# Patient Record
Sex: FEMALE | Race: WHITE | NOT HISPANIC OR LATINO | Employment: UNEMPLOYED | URBAN - METROPOLITAN AREA
[De-identification: names, ages, dates, MRNs, and addresses within clinical notes are randomized per-mention and may not be internally consistent; named-entity substitution may affect disease eponyms.]

---

## 2020-11-19 ENCOUNTER — OFFICE VISIT (OUTPATIENT)
Dept: OBSTETRICS AND GYNECOLOGY | Facility: CLINIC | Age: 20
End: 2020-11-19

## 2020-11-19 VITALS
BODY MASS INDEX: 33.49 KG/M2 | DIASTOLIC BLOOD PRESSURE: 70 MMHG | TEMPERATURE: 97.3 F | WEIGHT: 189 LBS | SYSTOLIC BLOOD PRESSURE: 118 MMHG | HEIGHT: 63 IN

## 2020-11-19 DIAGNOSIS — Z00.00 ANNUAL PHYSICAL EXAM: Primary | ICD-10-CM

## 2020-11-19 PROCEDURE — 99385 PREV VISIT NEW AGE 18-39: CPT | Performed by: NURSE PRACTITIONER

## 2020-11-19 NOTE — PROGRESS NOTES
GYN Annual Exam     CC - Here for annual exam.        HPI  Nai Ramirez is a 20 y.o. female, No obstetric history on file., who presents for annual well woman exam. No LMP recorded..  Periods are regular every 25-35 days, lasting 5 days. .  Dysmenorrhea:mild, occurring first 1-2 days of flow.  Patient reports problems with: none.  There were no changes to her medical or surgical history since her last visit.. Partner Status: Marital Status: single.  New Partners since last visit: yes.  Desires STD Screening: yes. This will be her first gyn exam and pap smear. She denies any gyn problems today.     Additional OB/GYN History   Current contraception: contraceptive methods: None  Desires to: not use contraception at this time  Last Pap :none   Last Completed Pap Smear       Status Date      PAP SMEAR No completions recorded        History of abnormal Pap smear: no  Family history of uterine, colon, breast, or ovarian cancer: no  Performs monthly Self-Breast Exam: no  Exercises Regularly:no  Feelings of Anxiety or Depression: no  Tobacco Usage?: Yes Nai Ramirez  does not have a smoking history on file. She uses smokeless tobacco.. I have educated her on the risk of diseases from using tobacco products such as cancer, COPD and heart disease.     I advised her to quit and she is not willing to quit.    I spent 3  minutes counseling the patient.        OB History    No obstetric history on file.         Health Maintenance   Topic Date Due   • Annual Gynecologic Pelvic and Breast Exam  1999   • ANNUAL PHYSICAL  11/13/2002   • HPV VACCINES (1 - 2-dose series) 11/13/2010   • TDAP/TD VACCINES (1 - Tdap) 11/13/2018   • INFLUENZA VACCINE  08/01/2020   • HEPATITIS C SCREENING  11/17/2020   • CHLAMYDIA SCREENING  11/17/2020   • PAP SMEAR  11/17/2020   • Pneumococcal Vaccine 0-64  Aged Out   • MENINGOCOCCAL VACCINE (Normal Risk)  Aged Out       The additional following portions of the patient's history were  "reviewed and updated as appropriate: allergies, current medications, past family history, past medical history, past social history, past surgical history and problem list.    Review of Systems   Constitutional: Negative.    Gastrointestinal: Negative.    Genitourinary: Negative.    Psychiatric/Behavioral: Negative.    All other systems reviewed and are negative.    All other systems reviewed and are negative.     I have reviewed and agree with the HPI, ROS, and historical information as entered above. Yoselin Ayala, LANDON    Objective   /70   Temp 97.3 °F (36.3 °C)   Ht 160 cm (63\")   Wt 85.7 kg (189 lb)   BMI 33.48 kg/m²     Physical Exam  Vitals signs and nursing note reviewed. Exam conducted with a chaperone present.   Constitutional:       Appearance: Normal appearance.   Cardiovascular:      Rate and Rhythm: Normal rate and regular rhythm.   Chest:      Breasts:         Right: Normal.         Left: Normal.   Genitourinary:     General: Normal vulva.      Vagina: Normal.      Cervix: Normal.      Uterus: Normal.       Adnexa: Right adnexa normal and left adnexa normal.      Rectum: Normal.   Neurological:      Mental Status: She is alert.            Assessment and Plan    Problem List Items Addressed This Visit     None      Visit Diagnoses     Annual physical exam    -  Primary    Relevant Orders    Pap IG, Ct-Ng TV Rfx HPV ASCU          1. GYN annual well woman exam.   2. Encouraged use of condoms for STD prevention.  3. Reviewed monthly self breast exams.  Instructed to call with lumps, pain, or breast discharge.    4. Reviewed exercise as a preventative health measures.   5. Reccommended Flu Vaccine in Fall of each year.  6. RTC in 1 year or PRN with problems      LANDON Cotton  11/19/2020  "

## 2020-12-02 DIAGNOSIS — Z00.00 ANNUAL PHYSICAL EXAM: ICD-10-CM

## 2022-10-12 ENCOUNTER — INITIAL PRENATAL (OUTPATIENT)
Dept: OBSTETRICS AND GYNECOLOGY | Facility: CLINIC | Age: 22
End: 2022-10-12

## 2022-10-12 VITALS — WEIGHT: 156.6 LBS | DIASTOLIC BLOOD PRESSURE: 70 MMHG | BODY MASS INDEX: 27.74 KG/M2 | SYSTOLIC BLOOD PRESSURE: 120 MMHG

## 2022-10-12 DIAGNOSIS — Z3A.08 8 WEEKS GESTATION OF PREGNANCY: Primary | ICD-10-CM

## 2022-10-12 DIAGNOSIS — O99.330 TOBACCO USE DURING PREGNANCY, ANTEPARTUM: ICD-10-CM

## 2022-10-12 PROBLEM — Z34.90 PREGNANCY: Status: ACTIVE | Noted: 2022-10-12

## 2022-10-12 PROCEDURE — 0501F PRENATAL FLOW SHEET: CPT | Performed by: OBSTETRICS & GYNECOLOGY

## 2022-10-12 NOTE — PROGRESS NOTES
Initial ob visit     CC- Here for care of pregnancy        Nai Ramirez is a 21 y.o. female, , who presents for her first obstetrical visit.  Her last LMP was Patient's last menstrual period was 2022.. Her VLAD is 2023, by Last Menstrual Period. Current GA is 8w1d.     Initial positive test date : , UPT        Her periods are: every 4 weeks  Patient's past medical history is significant for: marijuana and vapes nicotine.  Family history of genetic issues (includes FOB): denies   Prior infections concerning in pregnancy (Rash, fever in last 2 weeks): No  Varicella Hx - vaccinated   Prior testing for Cystic Fibrosis Carrier or Sickle Cell Trait- no   History of STD: no  Hx of HSV for patient or partner: no  Ultrasound Today: yes; confirms viable iup    OB History    Para Term  AB Living   1             SAB IAB Ectopic Molar Multiple Live Births                    # Outcome Date GA Lbr Vlad/2nd Weight Sex Delivery Anes PTL Lv   1 Current                Additional Pertinent History   Last Pap :  Result: negative HPV: not done     Last Completed Pap Smear          Ordered - PAP SMEAR (Every 3 Years) Ordered on 10/12/2022    2020  Pap IG, Ct-Ng TV Rfx HPV ASCU              History of abnormal Pap smear: no  Family history of uterine, colon, breast, or ovarian cancer: no  Feelings of Anxiety or Depression: no  Tobacco Usage?: Yes Nai Ramirez She vapes nicotine. I have educated her on the risk of diseases from using tobacco products such as cancer, COPD and heart disease.     I advised her to quit and she is willing to quit. We have discussed the following method/s for tobacco cessation:  Education Material Counseling.            Alcohol/Drug Use?: YES Drug: regular daily use; pt reports she is smoking marijuana in the morning and it helps manage her nausea  Over the age of 35 at delivery: no  Desires Genetic Screening: undecided   Flu Status:  Declines    PMH    Current Outpatient Medications:   •  Prenatal Vit-Fe Fumarate-FA (PRENATAL VITAMIN PO), Take  by mouth., Disp: , Rfl:      Past Medical History:   Diagnosis Date   • Urinary tract infection         History reviewed. No pertinent surgical history.    Review of Systems   Review of Systems  Patient Reports: Nausea and vomiting.  She has also had pink tinged discharge with wiping   Patient Denies: Heavy bleeding and Cramping  All systems reviewed and otherwise normal.    I have reviewed and agree with the HPI, ROS, and historical information as entered above. Maggi Collins MD    /70   Wt 71 kg (156 lb 9.6 oz)   LMP 08/16/2022   BMI 27.74 kg/m²     The additional following portions of the patient's history were reviewed and updated as appropriate: allergies, current medications, past family history, past medical history, past social history, past surgical history and problem list.    Physical Exam  General:  well developed; well nourished  no acute distress   Chest/Respiratory: No labored breathing, normal respiratory effort, normal appearance, no respiratory noises noted   Heart:  normal rate, regular rhythm,  no murmurs, rubs, or gallops   Thyroid: not examined   Breasts:  Not performed.   Abdomen: soft, non-tender; no masses  no umbilical or inguinal hernias are present  no hepato-splenomegaly   Pelvis: Clinical staff was present for exam  External genitalia:  normal appearance of the external genitalia including Bartholin's and [place]'s glands.  :  urethral meatus normal;  Vaginal:  normal pink mucosa without prolapse or lesions.  Cervix:  normal appearance.        Assessment and Plan    Problem List Items Addressed This Visit     Pregnancy - Primary    Overview     Reports family history of blood clot, is going to find out if tested for thrombophilia         Relevant Orders    LIQUID-BASED PAP SMEAR, [facility] ([facility])    Obstetric Panel    HIV-1 / O / 2 Ag / Antibody 4th Generation     Urinalysis With Microscopic - Urine, Clean Catch    Urine Culture - Urine, Urine, Clean Catch    Urine Drug Screen - Urine, Clean Catch    Tobacco use during pregnancy, antepartum       1. Pregnancy at 8w1d  2. Encouraged smoking cessation, she is working on it  3. Reviewed routine prenatal care with the office and educational materials given  4. Lab(s) Ordered  5. Discussed options for genetic testing including first trimester nuchal translucency screen, genetic disease carrier testing, quadruple screen, and Wing.  6. Patient is on Prenatal vitamins  7. Activity recommendation : 150 minutes/week of moderate intensity aerobic activity unless we limit for bleeding, hypertension or other pregnancy complication   Return in about 1 month (around 11/12/2022) for F/U Prenatal.      Maggi Collins MD  10/12/2022

## 2022-10-13 LAB
ABO GROUP BLD: ABNORMAL
BASOPHILS # BLD AUTO: 0.1 X10E3/UL (ref 0–0.2)
BASOPHILS NFR BLD AUTO: 0 %
BLD GP AB SCN SERPL QL: NEGATIVE
EOSINOPHIL # BLD AUTO: 0.1 X10E3/UL (ref 0–0.4)
EOSINOPHIL NFR BLD AUTO: 1 %
ERYTHROCYTE [DISTWIDTH] IN BLOOD BY AUTOMATED COUNT: 11.8 % (ref 11.7–15.4)
HBV SURFACE AG SERPL QL IA: NEGATIVE
HCT VFR BLD AUTO: 41.1 % (ref 34–46.6)
HCV AB S/CO SERPL IA: <0.1 S/CO RATIO (ref 0–0.9)
HGB BLD-MCNC: 14 G/DL (ref 11.1–15.9)
HIV 1+2 AB+HIV1 P24 AG SERPL QL IA: NON REACTIVE
IMM GRANULOCYTES # BLD AUTO: 0.1 X10E3/UL (ref 0–0.1)
IMM GRANULOCYTES NFR BLD AUTO: 1 %
LYMPHOCYTES # BLD AUTO: 2.4 X10E3/UL (ref 0.7–3.1)
LYMPHOCYTES NFR BLD AUTO: 16 %
MCH RBC QN AUTO: 33 PG (ref 26.6–33)
MCHC RBC AUTO-ENTMCNC: 34.1 G/DL (ref 31.5–35.7)
MCV RBC AUTO: 97 FL (ref 79–97)
MONOCYTES # BLD AUTO: 0.8 X10E3/UL (ref 0.1–0.9)
MONOCYTES NFR BLD AUTO: 5 %
NEUTROPHILS # BLD AUTO: 11.8 X10E3/UL (ref 1.4–7)
NEUTROPHILS NFR BLD AUTO: 77 %
PLATELET # BLD AUTO: 272 X10E3/UL (ref 150–450)
RBC # BLD AUTO: 4.24 X10E6/UL (ref 3.77–5.28)
RH BLD: POSITIVE
RPR SER QL: NON REACTIVE
RUBV IGG SERPL IA-ACNC: 1.8 INDEX
WBC # BLD AUTO: 15.1 X10E3/UL (ref 3.4–10.8)

## 2022-10-14 LAB
AMPHETAMINES UR QL SCN: NEGATIVE NG/ML
APPEARANCE UR: ABNORMAL
BACTERIA #/AREA URNS HPF: ABNORMAL /[HPF]
BACTERIA UR CULT: NORMAL
BACTERIA UR CULT: NORMAL
BARBITURATES UR QL SCN: NEGATIVE NG/ML
BENZODIAZ UR QL SCN: NEGATIVE NG/ML
BILIRUB UR QL STRIP: NEGATIVE
BZE UR QL SCN: NEGATIVE NG/ML
CANNABINOIDS UR QL SCN: POSITIVE NG/ML
CASTS URNS QL MICRO: ABNORMAL /LPF
COLOR UR: YELLOW
CREAT UR-MCNC: 48.9 MG/DL (ref 20–300)
EPI CELLS #/AREA URNS HPF: >10 /HPF (ref 0–10)
GLUCOSE UR QL STRIP: NEGATIVE
HGB UR QL STRIP: NEGATIVE
KETONES UR QL STRIP: NEGATIVE
LABORATORY COMMENT REPORT: ABNORMAL
LEUKOCYTE ESTERASE UR QL STRIP: ABNORMAL
METHADONE UR QL SCN: NEGATIVE NG/ML
MICRO URNS: ABNORMAL
NITRITE UR QL STRIP: NEGATIVE
OPIATES UR QL SCN: NEGATIVE NG/ML
OXYCODONE+OXYMORPHONE UR QL SCN: NEGATIVE NG/ML
PCP UR QL: NEGATIVE NG/ML
PH UR STRIP: 8 [PH] (ref 5–7.5)
PH UR: 7.7 [PH] (ref 4.5–8.9)
PROPOXYPH UR QL SCN: NEGATIVE NG/ML
PROT UR QL STRIP: NEGATIVE
RBC #/AREA URNS HPF: ABNORMAL /HPF (ref 0–2)
REF LAB TEST METHOD: NORMAL
SP GR UR STRIP: 1.01 (ref 1–1.03)
UROBILINOGEN UR STRIP-MCNC: 0.2 MG/DL (ref 0.2–1)
WBC #/AREA URNS HPF: ABNORMAL /HPF (ref 0–5)

## 2022-10-25 ENCOUNTER — LAB (OUTPATIENT)
Dept: OBSTETRICS AND GYNECOLOGY | Facility: CLINIC | Age: 22
End: 2022-10-25

## 2022-10-25 DIAGNOSIS — Z34.91 FIRST TRIMESTER PREGNANCY: Primary | ICD-10-CM

## 2022-11-09 ENCOUNTER — ROUTINE PRENATAL (OUTPATIENT)
Dept: OBSTETRICS AND GYNECOLOGY | Facility: CLINIC | Age: 22
End: 2022-11-09

## 2022-11-09 VITALS — DIASTOLIC BLOOD PRESSURE: 70 MMHG | BODY MASS INDEX: 28.52 KG/M2 | SYSTOLIC BLOOD PRESSURE: 120 MMHG | WEIGHT: 161 LBS

## 2022-11-09 DIAGNOSIS — R82.5 POSITIVE URINE DRUG SCREEN: Primary | ICD-10-CM

## 2022-11-09 DIAGNOSIS — O99.330 TOBACCO USE DURING PREGNANCY, ANTEPARTUM: ICD-10-CM

## 2022-11-09 DIAGNOSIS — Z3A.12 12 WEEKS GESTATION OF PREGNANCY: ICD-10-CM

## 2022-11-09 LAB
GLUCOSE UR STRIP-MCNC: NEGATIVE MG/DL
PROT UR STRIP-MCNC: NEGATIVE MG/DL

## 2022-11-09 PROCEDURE — 0502F SUBSEQUENT PRENATAL CARE: CPT | Performed by: OBSTETRICS & GYNECOLOGY

## 2022-11-09 NOTE — PROGRESS NOTES
OB FOLLOW UP  CC- Here for care of pregnancy        Nai Ramirez is a 21 y.o.  12w1d patient being seen today for her obstetrical follow up visit. Patient reports nausea and occasional vomiting but has drastically improved since her last visit. cfdna neg/girl baby.  She has decreased marijuana usage and vaping nicotine.     Her prenatal care is complicated by (and status) :   Patient Active Problem List   Diagnosis   • Pregnancy   • Tobacco use during pregnancy, antepartum         Flu Status: Declines  Ultrasound Today: No    ROS -   Patient Denies: Loss of Fluid, Vaginal Spotting, Vision Changes and Headaches    All other systems reviewed and are negative.     The additional following portions of the patient's history were reviewed and updated as appropriate: allergies and current medications.    I have reviewed and agree with the HPI, ROS, and historical information as entered above. Maggi Collins MD    /70   Wt 73 kg (161 lb)   LMP 2022   BMI 28.52 kg/m²         EXAM:     Prenatal Vitals  BP: 120/70  Weight: 73 kg (161 lb)   Fetal Heart Rate: +          Urine Glucose Read-only: Negative  Urine Protein Read-only: Negative       Assessment and Plan    Problem List Items Addressed This Visit     Pregnancy    Overview     Reports family history of blood clot, is going to find out if tested for thrombophilia  cfDNA low risk         Tobacco use during pregnancy, antepartum   Other Visit Diagnoses     Positive urine drug screen    -  Primary    Relevant Orders    Urine Drug Screen - Urine, Clean Catch          1. Pregnancy at 12w1d  2. She reports there was no family history of thrombophilia detected (her mom has history of DVT)  3. Labs reviewed from New OB Visit.  4. Counseled on genetic testing, carrier status and option for NT screen  5. Activity and Exercise discussed.  6. Patient is on Prenatal vitamins  Return in about 1 month (around 2022) for F/U Prenatal.    Maggi Collins  MD  11/09/2022

## 2022-11-09 NOTE — PROGRESS NOTES
"        OB FOLLOW UP  CC- Here for care of pregnancy        Nai Ramirez is a 21 y.o.  12w1d patient being seen today for her obstetrical follow up visit. Patient reports {pregnancy complaints lexob:08386}.     Her prenatal care is complicated by (and status) : {OB problems:84349}  Patient Active Problem List   Diagnosis   • Pregnancy   • Tobacco use during pregnancy, antepartum       Desires genetic testing?: {Genetic Testing?:04460}  Flu Status: {Current Flu Status:97556}  Ultrasound Today: {Responses; yes/no (default no):15152}    ROS -   Patient Reports : {OB Symptoms:30916}  Patient Denies: {OBSymptoms:98454}  Fetal Movement : {desc; normal/decreased:88855}  All other systems reviewed and are negative.     The additional following portions of the patient's history were reviewed and updated as appropriate: {history reviewed:::\"allergies\",\"current medications\",\"past family history\",\"past medical history\",\"past social history\",\"past surgical history\",\"problem list\"}.    I have reviewed and agree with the HPI, ROS, and historical information as entered above. Anabella Moreno MA    LMP 2022         EXAM:                    Urine Glucose Read-only: Negative  Urine Protein Read-only: Negative       Assessment and Plan    Problem List Items Addressed This Visit        Gravid and     Pregnancy    Overview     Reports family history of blood clot, is going to find out if tested for thrombophilia        Other Visit Diagnoses     Mild tetrahydrocannabinol (THC) abuse    -  Primary    Relevant Orders    Urine Drug Screen - Urine, Clean Catch          1. Pregnancy at 12w1d  2. Labs reviewed from New OB Visit.  3. Counseled on genetic testing, carrier status and option for NT screen  4. Activity and Exercise discussed.  5. {pregnancy plan 12wks lexob:45045::\"Patient is on Prenatal vitamins\"}  6. No follow-ups on file.    Anabella Moreno MA  2022  "

## 2022-11-11 LAB
AMPHETAMINES UR QL SCN: NEGATIVE NG/ML
BARBITURATES UR QL SCN: NEGATIVE NG/ML
BENZODIAZ UR QL SCN: NEGATIVE NG/ML
BZE UR QL SCN: NEGATIVE NG/ML
CANNABINOIDS UR QL SCN: POSITIVE NG/ML
CREAT UR-MCNC: 78.9 MG/DL (ref 20–300)
LABORATORY COMMENT REPORT: ABNORMAL
METHADONE UR QL SCN: NEGATIVE NG/ML
OPIATES UR QL SCN: NEGATIVE NG/ML
OXYCODONE+OXYMORPHONE UR QL SCN: NEGATIVE NG/ML
PCP UR QL: NEGATIVE NG/ML
PH UR: 7.3 [PH] (ref 4.5–8.9)
PROPOXYPH UR QL SCN: NEGATIVE NG/ML

## 2022-11-16 ENCOUNTER — TELEPHONE (OUTPATIENT)
Dept: OBSTETRICS AND GYNECOLOGY | Facility: CLINIC | Age: 22
End: 2022-11-16

## 2022-11-16 NOTE — TELEPHONE ENCOUNTER
Hub staff attempted to follow warm transfer process and was unsuccessful     Caller: Nai Ramirez    Relationship to patient: Self    Best call back number: [phone]    Patient is needing: PT TRIED RETURNING CALL SHE MISSED. I WAS UNABLE TO WARM TRANSFER TO THE OFFICE.   PT CAN BE REACHED ANYTIME DURING THE DAY AND A VM CAN BE LEFT IF SHE ISN'T REACHED.

## 2022-11-17 ENCOUNTER — TELEPHONE (OUTPATIENT)
Dept: OBSTETRICS AND GYNECOLOGY | Facility: CLINIC | Age: 22
End: 2022-11-17

## 2022-11-17 NOTE — TELEPHONE ENCOUNTER
Patient of Dr Collins  13 weeks 2 days    Patient reports that she has been experiencing vomiting (nothing new)  but this morning she vomited for the 3rd time (mostly mucus mixed with a small amount of bright red blood). She is concerned about the small amount of bright red blood. She states that she also had a migraine and she is experiencing congestion and drainage. She reports she did drink red klever-aid this morning around 0100 and the instance of vomiting with blood mixed with mucus was at 0600.      Encouraged patient to monitor further and if she begins to vomit large amounts of blood to report to the ER for evaluation. Pt NILSON.        -Janusz Wakefield

## 2022-12-07 ENCOUNTER — ROUTINE PRENATAL (OUTPATIENT)
Dept: OBSTETRICS AND GYNECOLOGY | Facility: CLINIC | Age: 22
End: 2022-12-07

## 2022-12-07 VITALS — BODY MASS INDEX: 29.41 KG/M2 | WEIGHT: 166 LBS | SYSTOLIC BLOOD PRESSURE: 118 MMHG | DIASTOLIC BLOOD PRESSURE: 66 MMHG

## 2022-12-07 DIAGNOSIS — O99.330 TOBACCO USE DURING PREGNANCY, ANTEPARTUM: ICD-10-CM

## 2022-12-07 DIAGNOSIS — Z3A.16 16 WEEKS GESTATION OF PREGNANCY: Primary | ICD-10-CM

## 2022-12-07 DIAGNOSIS — Z3A.20 20 WEEKS GESTATION OF PREGNANCY: ICD-10-CM

## 2022-12-07 LAB
GLUCOSE UR STRIP-MCNC: NEGATIVE MG/DL
PROT UR STRIP-MCNC: NEGATIVE MG/DL

## 2022-12-07 PROCEDURE — 0502F SUBSEQUENT PRENATAL CARE: CPT | Performed by: NURSE PRACTITIONER

## 2022-12-07 NOTE — PROGRESS NOTES
OB FOLLOW UP  CC- Here for care of pregnancy        Nai Ramirez is a 22 y.o.  16w1d patient being seen today for her obstetrical follow up visit. Patient reports hemorrhoids which over the counter meds are controlling.  She has a hx of lactose intolerance.  To help with constipation, she eats dairy.    Her prenatal care is complicated by (and status) :   Patient Active Problem List   Diagnosis   • Pregnancy   • Tobacco use during pregnancy, antepartum       Flu Status: Declines  Ultrasound Today: No    AFP: discussed and decided to be drawn today.  ROS -   Patient Reports : No Problems  Patient Denies: Loss of Fluid, Vaginal Spotting, Vision Changes, Headaches, Nausea  and Vomiting   Fetal Movement : normal  All other systems reviewed and are negative.     The additional following portions of the patient's history were reviewed and updated as appropriate: allergies, current medications, past family history, past medical history, past social history, past surgical history and problem list.    I have reviewed and agree with the HPI, ROS, and historical information as entered above. Andreia Kim, APRN        EXAM:     Prenatal Vitals  BP: 118/66  Weight: 75.3 kg (166 lb)   Fetal Heart Rate: 145   Pelvic Exam:      Urine Glucose Read-only: Negative  Urine Protein Read-only: Negative       Assessment and Plan    Problem List Items Addressed This Visit        Gravid and     Pregnancy - Primary    Overview     cfDNA low risk         Relevant Orders    Alpha Fetoprotein, Maternal    US Ob 14 + Weeks Single or First Gestation       Tobacco    Tobacco use during pregnancy, antepartum       1. Pregnancy at 16w1d, FHT 145bpm.  2. Fetal status reassuring.   3. Counseled on MSAFP alone in relation to OTD and placental issues.  Drawn today.  4. Anatomy scan next visit.   5. Activity and Exercise discussed.  6. Lab(s) Ordered  7. U/S ordered at follow up  8. Patient is on Prenatal  vitamins  9. Discussed/encouraged smoking cessation  Return in about 4 weeks (around 1/4/2023) for anatomy US.    LANDON Beverly  12/07/2022 Scribed for LANDON Beverly by Teresa NAVARRETE. 12/7/2022  12:33 EST

## 2022-12-09 LAB
AFP INTERP SERPL-IMP: NORMAL
AFP INTERP SERPL-IMP: NORMAL
AFP MOM SERPL: 0.73
AFP SERPL-MCNC: 24.4 NG/ML
AGE AT DELIVERY: 23.5 YR
GA METHOD: NORMAL
GA: 16.1 WEEKS
IDDM PATIENT QL: NO
LABORATORY COMMENT REPORT: NORMAL
MULTIPLE PREGNANCY: NO
NEURAL TUBE DEFECT RISK FETUS: NORMAL %
RESULT: NORMAL

## 2023-01-04 ENCOUNTER — ROUTINE PRENATAL (OUTPATIENT)
Dept: OBSTETRICS AND GYNECOLOGY | Facility: CLINIC | Age: 23
End: 2023-01-04
Payer: COMMERCIAL

## 2023-01-04 VITALS — WEIGHT: 174 LBS | DIASTOLIC BLOOD PRESSURE: 70 MMHG | SYSTOLIC BLOOD PRESSURE: 110 MMHG | BODY MASS INDEX: 30.82 KG/M2

## 2023-01-04 DIAGNOSIS — O99.330 TOBACCO USE DURING PREGNANCY, ANTEPARTUM: ICD-10-CM

## 2023-01-04 DIAGNOSIS — Z3A.20 20 WEEKS GESTATION OF PREGNANCY: ICD-10-CM

## 2023-01-04 DIAGNOSIS — Z34.00 SUPERVISION OF NORMAL FIRST PREGNANCY, ANTEPARTUM: Primary | ICD-10-CM

## 2023-01-04 LAB
GLUCOSE UR STRIP-MCNC: NEGATIVE MG/DL
PROT UR STRIP-MCNC: NEGATIVE MG/DL

## 2023-01-04 PROCEDURE — 0502F SUBSEQUENT PRENATAL CARE: CPT | Performed by: OBSTETRICS & GYNECOLOGY

## 2023-01-04 NOTE — PROGRESS NOTES
OB FOLLOW UP  CC- Here for care of pregnancy        Nai Ramirez is a 22 y.o.  20w1d patient being seen today for her obstetrical follow up visit. Patient reports AFP neg. C/o (R) sided sciatic pain. C/o occasional headaches, thin clear watery discharge, and sinus congestion.      Her prenatal care is complicated by (and status) :   Patient Active Problem List   Diagnosis   • Pregnancy   • Tobacco use during pregnancy, antepartum       Flu Status: Declines  Ultrasound Today: Yes; LVOT and cranial anatomy suboptimally visualized, need profile and RVOT @ follow up and fluid noted in cervix    ROS -     Patient Denies: Loss of Fluid, Vaginal Spotting, Vision Changes, Nausea , Vomiting  and Contractions  Fetal Movement : normal  All other systems reviewed and are negative.       The additional following portions of the patient's history were reviewed and updated as appropriate: allergies, current medications, past family history, past medical history, past social history, past surgical history and problem list.    I have reviewed and agree with the HPI, ROS, and historical information as entered above. Maggi Collins MD    /70   Wt 78.9 kg (174 lb)   LMP 2022   BMI 30.82 kg/m²       EXAM:     Prenatal Vitals  BP: 110/70  Weight: 78.9 kg (174 lb)   Fetal Heart Rate: 138          Urine Glucose Read-only: Negative  Urine Protein Read-only: Negative       Assessment and Plan    Problem List Items Addressed This Visit     Pregnancy    Overview     cfDNA low risk, AFP neg.          Tobacco use during pregnancy, antepartum    Relevant Orders    US Ob Transvaginal    US Ob Follow Up Transabdominal Approach   Other Visit Diagnoses     Supervision of normal first pregnancy, antepartum    -  Primary    Relevant Orders    POC Urinalysis Dipstick (Completed)    US Ob Transvaginal    US Ob Follow Up Transabdominal Approach          1. Pregnancy at 20w1d  2. Anatomy scan today is incomplete, needs FU views.  also some fluid seen in cervical canal, will repeat CL in 1 week.   3. Fetal status reassuring.   4. Activity and Exercise discussed.  5. Patient is on Prenatal vitamins  Return in about 1 week (around 1/11/2023) for F/U Prenatal, U/S Next Visit.    Maggi Collins MD  01/04/2023

## 2023-01-11 ENCOUNTER — ROUTINE PRENATAL (OUTPATIENT)
Dept: OBSTETRICS AND GYNECOLOGY | Facility: CLINIC | Age: 23
End: 2023-01-11
Payer: COMMERCIAL

## 2023-01-11 ENCOUNTER — OFFICE VISIT (OUTPATIENT)
Dept: OBSTETRICS AND GYNECOLOGY | Facility: HOSPITAL | Age: 23
End: 2023-01-11
Payer: COMMERCIAL

## 2023-01-11 ENCOUNTER — HOSPITAL ENCOUNTER (OUTPATIENT)
Dept: WOMENS IMAGING | Facility: HOSPITAL | Age: 23
Discharge: HOME | End: 2023-01-11
Admitting: OBSTETRICS & GYNECOLOGY
Payer: COMMERCIAL

## 2023-01-11 VITALS — BODY MASS INDEX: 31.11 KG/M2 | WEIGHT: 175.6 LBS | DIASTOLIC BLOOD PRESSURE: 70 MMHG | SYSTOLIC BLOOD PRESSURE: 122 MMHG

## 2023-01-11 VITALS — BODY MASS INDEX: 30.93 KG/M2 | DIASTOLIC BLOOD PRESSURE: 65 MMHG | WEIGHT: 174.6 LBS | SYSTOLIC BLOOD PRESSURE: 109 MMHG

## 2023-01-11 DIAGNOSIS — Z3A.21 21 WEEKS GESTATION OF PREGNANCY: ICD-10-CM

## 2023-01-11 DIAGNOSIS — Z34.00 SUPERVISION OF NORMAL FIRST PREGNANCY, ANTEPARTUM: Primary | ICD-10-CM

## 2023-01-11 DIAGNOSIS — Z34.00 SUPERVISION OF NORMAL FIRST PREGNANCY, ANTEPARTUM: ICD-10-CM

## 2023-01-11 DIAGNOSIS — N88.8 CERVICAL FUNNELING: Primary | ICD-10-CM

## 2023-01-11 DIAGNOSIS — N88.8 CERVICAL FUNNELING: ICD-10-CM

## 2023-01-11 DIAGNOSIS — O99.330 TOBACCO USE DURING PREGNANCY, ANTEPARTUM: ICD-10-CM

## 2023-01-11 LAB
GLUCOSE UR STRIP-MCNC: NEGATIVE MG/DL
PROT UR STRIP-MCNC: NEGATIVE MG/DL

## 2023-01-11 PROCEDURE — 76811 OB US DETAILED SNGL FETUS: CPT

## 2023-01-11 PROCEDURE — 76817 TRANSVAGINAL US OBSTETRIC: CPT

## 2023-01-11 PROCEDURE — 0502F SUBSEQUENT PRENATAL CARE: CPT | Performed by: OBSTETRICS & GYNECOLOGY

## 2023-01-11 PROCEDURE — 99214 OFFICE O/P EST MOD 30 MIN: CPT | Performed by: OBSTETRICS & GYNECOLOGY

## 2023-01-11 RX ORDER — PROGESTERONE 100 MG/1
100 CAPSULE ORAL DAILY
Qty: 30 CAPSULE | Refills: 6 | Status: SHIPPED | OUTPATIENT
Start: 2023-01-11 | End: 2023-02-01 | Stop reason: SDUPTHER

## 2023-01-11 NOTE — PROGRESS NOTES
Denies problems. Reports had NIPS with normal results. Next OB visit is 2/8/2023. Cervical funneling noted by u/s @ prenatal visit today.

## 2023-01-11 NOTE — PROGRESS NOTES
Patient seen in Maternal Fetal Medicine clinic today. Please see full note in under imaging tab of patient chart in Epic (Viewpoint report).    Tracy Mcdonough MD

## 2023-01-11 NOTE — PROGRESS NOTES
OB FOLLOW UP  CC- Here for care of pregnancy        Nai Ramirez is a 22 y.o.  21w1d patient being seen today for her obstetrical follow up visit. Patient reports occasional vomiting (*1 this morning) headaches.  She vapes nicotine and is decreasing use.     Her prenatal care is complicated by (and status) :   Patient Active Problem List   Diagnosis   • Pregnancy   • Tobacco use during pregnancy, antepartum   • Cervical funneling       Flu Status: Declines  Ultrasound Today: Yes, breech presentation. Anechoic fluid visualized throughout cervix extending to external os. V shaped funneling noted.     ROS -     Patient Denies: Loss of Fluid, Vaginal Spotting, Vision Changes, Nausea , Contractions and Epigastric pain  Fetal Movement : normal  All other systems reviewed and are negative.       The additional following portions of the patient's history were reviewed and updated as appropriate: allergies, current medications, past family history, past medical history, past social history, past surgical history and problem list.    I have reviewed and agree with the HPI, ROS, and historical information as entered above. Maggi Collins MD    /70   Wt 79.7 kg (175 lb 9.6 oz)   LMP 2022   BMI 31.11 kg/m²       EXAM:     Prenatal Vitals  BP: 122/70  Weight: 79.7 kg (175 lb 9.6 oz)   Fetal Heart Rate: 129          Urine Glucose Read-only: Negative  Urine Protein Read-only: Negative       Assessment and Plan    Problem List Items Addressed This Visit        Gynecologic and Obstetric Problems    Cervical funneling    Overview     21 wks. CL 2.9cm, but small funnel seen 20 and 21 wks. Vaginal progesterone 100mg, refer PDC            Other    Pregnancy    Overview     cfDNA low risk, AFP neg.          Tobacco use during pregnancy, antepartum    Relevant Orders    [facility]   Other Visit Diagnoses     Supervision of normal first pregnancy, antepartum    -  Primary    Relevant  Orders    POC Urinalysis Dipstick (Completed)    [facility]          1. Pregnancy at 21w1d  2. Anatomy scan today is refer to PDC for small cervical funnel by end of week. Start prometrium 100mg PV daily. Cont pelvic rest and no heavy lifting.   3. Fetal status reassuring.   4. Activity and Exercise discussed.  5. Patient is on Prenatal vitamins  Return in about 1 month (around 2023) for F/U Prenatal.    Maggi Collins MD  2023

## 2023-01-18 ENCOUNTER — HOSPITAL ENCOUNTER (OUTPATIENT)
Dept: WOMENS IMAGING | Facility: HOSPITAL | Age: 23
Discharge: HOME | End: 2023-01-18
Admitting: OBSTETRICS & GYNECOLOGY
Payer: COMMERCIAL

## 2023-01-18 ENCOUNTER — HOSPITAL ENCOUNTER (INPATIENT)
Facility: HOSPITAL | Age: 23
LOS: 2 days | Discharge: HOME | DRG: 819 | End: 2023-01-20
Attending: OBSTETRICS & GYNECOLOGY | Admitting: OBSTETRICS & GYNECOLOGY
Payer: COMMERCIAL

## 2023-01-18 ENCOUNTER — OFFICE VISIT (OUTPATIENT)
Dept: OBSTETRICS AND GYNECOLOGY | Facility: HOSPITAL | Age: 23
End: 2023-01-18
Payer: COMMERCIAL

## 2023-01-18 VITALS — BODY MASS INDEX: 30.79 KG/M2 | DIASTOLIC BLOOD PRESSURE: 70 MMHG | WEIGHT: 173.8 LBS | SYSTOLIC BLOOD PRESSURE: 114 MMHG

## 2023-01-18 DIAGNOSIS — N88.8 CERVICAL FUNNELING: ICD-10-CM

## 2023-01-18 DIAGNOSIS — Z3A.22 22 WEEKS GESTATION OF PREGNANCY: ICD-10-CM

## 2023-01-18 DIAGNOSIS — O26.872 CERVICAL SHORTENING AFFECTING PREGNANCY IN SECOND TRIMESTER: Primary | ICD-10-CM

## 2023-01-18 LAB
ABO GROUP BLD: NORMAL
ABO GROUP BLD: NORMAL
BLD GP AB SCN SERPL QL: NEGATIVE
DEPRECATED RDW RBC AUTO: 42.2 FL (ref 37–54)
ERYTHROCYTE [DISTWIDTH] IN BLOOD BY AUTOMATED COUNT: 12.1 % (ref 12.3–15.4)
HCT VFR BLD AUTO: 35.6 % (ref 34–46.6)
HGB BLD-MCNC: 12.5 G/DL (ref 12–15.9)
MCH RBC QN AUTO: 33.5 PG (ref 26.6–33)
MCHC RBC AUTO-ENTMCNC: 35.1 G/DL (ref 31.5–35.7)
MCV RBC AUTO: 95.4 FL (ref 79–97)
PLATELET # BLD AUTO: 257 10*3/MM3 (ref 140–450)
PMV BLD AUTO: 10.6 FL (ref 6–12)
RBC # BLD AUTO: 3.73 10*6/MM3 (ref 3.77–5.28)
RH BLD: POSITIVE
RH BLD: POSITIVE
T&S EXPIRATION DATE: NORMAL
WBC NRBC COR # BLD: 13.95 10*3/MM3 (ref 3.4–10.8)

## 2023-01-18 PROCEDURE — 86901 BLOOD TYPING SEROLOGIC RH(D): CPT | Performed by: OBSTETRICS & GYNECOLOGY

## 2023-01-18 PROCEDURE — 99222 1ST HOSP IP/OBS MODERATE 55: CPT | Performed by: OBSTETRICS & GYNECOLOGY

## 2023-01-18 PROCEDURE — 76815 OB US LIMITED FETUS(S): CPT | Performed by: OBSTETRICS & GYNECOLOGY

## 2023-01-18 PROCEDURE — 76815 OB US LIMITED FETUS(S): CPT

## 2023-01-18 PROCEDURE — 85027 COMPLETE CBC AUTOMATED: CPT | Performed by: OBSTETRICS & GYNECOLOGY

## 2023-01-18 PROCEDURE — 25010000002 AMPICILLIN PER 500 MG: Performed by: OBSTETRICS & GYNECOLOGY

## 2023-01-18 PROCEDURE — 86900 BLOOD TYPING SEROLOGIC ABO: CPT | Performed by: OBSTETRICS & GYNECOLOGY

## 2023-01-18 PROCEDURE — 86900 BLOOD TYPING SEROLOGIC ABO: CPT

## 2023-01-18 PROCEDURE — 76817 TRANSVAGINAL US OBSTETRIC: CPT

## 2023-01-18 PROCEDURE — 86901 BLOOD TYPING SEROLOGIC RH(D): CPT

## 2023-01-18 PROCEDURE — 25010000002 AZITHROMYCIN PER 500 MG: Performed by: OBSTETRICS & GYNECOLOGY

## 2023-01-18 PROCEDURE — 76817 TRANSVAGINAL US OBSTETRIC: CPT | Performed by: OBSTETRICS & GYNECOLOGY

## 2023-01-18 PROCEDURE — 86850 RBC ANTIBODY SCREEN: CPT | Performed by: OBSTETRICS & GYNECOLOGY

## 2023-01-18 RX ORDER — SODIUM CHLORIDE 0.9 % (FLUSH) 0.9 %
10 SYRINGE (ML) INJECTION EVERY 12 HOURS SCHEDULED
Status: DISCONTINUED | OUTPATIENT
Start: 2023-01-18 | End: 2023-01-20 | Stop reason: HOSPADM

## 2023-01-18 RX ORDER — SODIUM CHLORIDE, SODIUM LACTATE, POTASSIUM CHLORIDE, CALCIUM CHLORIDE 600; 310; 30; 20 MG/100ML; MG/100ML; MG/100ML; MG/100ML
125 INJECTION, SOLUTION INTRAVENOUS CONTINUOUS
Status: DISCONTINUED | OUTPATIENT
Start: 2023-01-18 | End: 2023-01-20 | Stop reason: HOSPADM

## 2023-01-18 RX ORDER — LIDOCAINE HYDROCHLORIDE 10 MG/ML
5 INJECTION, SOLUTION EPIDURAL; INFILTRATION; INTRACAUDAL; PERINEURAL AS NEEDED
Status: DISCONTINUED | OUTPATIENT
Start: 2023-01-18 | End: 2023-01-20 | Stop reason: HOSPADM

## 2023-01-18 RX ORDER — ONDANSETRON 2 MG/ML
4 INJECTION INTRAMUSCULAR; INTRAVENOUS EVERY 8 HOURS PRN
Status: DISCONTINUED | OUTPATIENT
Start: 2023-01-18 | End: 2023-01-20 | Stop reason: HOSPADM

## 2023-01-18 RX ORDER — ACETAMINOPHEN 500 MG
500 TABLET ORAL EVERY 6 HOURS PRN
COMMUNITY

## 2023-01-18 RX ORDER — INDOMETHACIN 25 MG/1
50 CAPSULE ORAL ONCE
Status: COMPLETED | OUTPATIENT
Start: 2023-01-18 | End: 2023-01-18

## 2023-01-18 RX ORDER — ONDANSETRON 4 MG/1
4 TABLET, FILM COATED ORAL EVERY 8 HOURS PRN
Status: DISCONTINUED | OUTPATIENT
Start: 2023-01-18 | End: 2023-01-20 | Stop reason: HOSPADM

## 2023-01-18 RX ORDER — SODIUM CHLORIDE 0.9 % (FLUSH) 0.9 %
1-10 SYRINGE (ML) INJECTION AS NEEDED
Status: DISCONTINUED | OUTPATIENT
Start: 2023-01-18 | End: 2023-01-20 | Stop reason: HOSPADM

## 2023-01-18 RX ORDER — HYDROXYZINE HYDROCHLORIDE 25 MG/1
25 TABLET, FILM COATED ORAL NIGHTLY PRN
Status: DISCONTINUED | OUTPATIENT
Start: 2023-01-18 | End: 2023-01-20 | Stop reason: HOSPADM

## 2023-01-18 RX ORDER — INDOMETHACIN 25 MG/1
25 CAPSULE ORAL EVERY 6 HOURS
Status: DISCONTINUED | OUTPATIENT
Start: 2023-01-18 | End: 2023-01-20 | Stop reason: HOSPADM

## 2023-01-18 RX ADMIN — INDOMETHACIN 50 MG: 25 CAPSULE ORAL at 13:47

## 2023-01-18 RX ADMIN — HYDROXYZINE HYDROCHLORIDE 25 MG: 25 TABLET, FILM COATED ORAL at 21:22

## 2023-01-18 RX ADMIN — AZITHROMYCIN 500 MG: 500 INJECTION, POWDER, LYOPHILIZED, FOR SOLUTION INTRAVENOUS at 14:32

## 2023-01-18 RX ADMIN — SODIUM CHLORIDE, POTASSIUM CHLORIDE, SODIUM LACTATE AND CALCIUM CHLORIDE 125 ML/HR: 600; 310; 30; 20 INJECTION, SOLUTION INTRAVENOUS at 13:30

## 2023-01-18 RX ADMIN — SODIUM CHLORIDE, POTASSIUM CHLORIDE, SODIUM LACTATE AND CALCIUM CHLORIDE 125 ML/HR: 600; 310; 30; 20 INJECTION, SOLUTION INTRAVENOUS at 19:37

## 2023-01-18 RX ADMIN — AMPICILLIN SODIUM 2 G: 2 INJECTION, POWDER, FOR SOLUTION INTRAVENOUS at 19:37

## 2023-01-18 RX ADMIN — AMPICILLIN SODIUM 2 G: 2 INJECTION, POWDER, FOR SOLUTION INTRAVENOUS at 13:50

## 2023-01-18 RX ADMIN — Medication 10 ML: at 21:22

## 2023-01-18 RX ADMIN — INDOMETHACIN 25 MG: 25 CAPSULE ORAL at 19:32

## 2023-01-18 NOTE — PROGRESS NOTES
Patient seen in Maternal Fetal Medicine clinic today. Please see full note in under imaging tab of patient chart in Epic (Viewpoint report).    Maggi Pascal MD

## 2023-01-19 ENCOUNTER — ANESTHESIA EVENT (OUTPATIENT)
Dept: LABOR AND DELIVERY | Facility: HOSPITAL | Age: 23
DRG: 819 | End: 2023-01-19
Payer: COMMERCIAL

## 2023-01-19 PROCEDURE — 25010000002 ONDANSETRON PER 1 MG: Performed by: NURSE ANESTHETIST, CERTIFIED REGISTERED

## 2023-01-19 PROCEDURE — 25010000002 FENTANYL CITRATE (PF) 100 MCG/2ML SOLUTION: Performed by: NURSE ANESTHETIST, CERTIFIED REGISTERED

## 2023-01-19 PROCEDURE — 25010000002 ONDANSETRON PER 1 MG: Performed by: OBSTETRICS & GYNECOLOGY

## 2023-01-19 PROCEDURE — 25010000002 AMPICILLIN PER 500 MG: Performed by: OBSTETRICS & GYNECOLOGY

## 2023-01-19 PROCEDURE — 59320 REVISION OF CERVIX: CPT | Performed by: OBSTETRICS & GYNECOLOGY

## 2023-01-19 PROCEDURE — 25010000002 MIDAZOLAM PER 1 MG: Performed by: NURSE ANESTHETIST, CERTIFIED REGISTERED

## 2023-01-19 PROCEDURE — 0UVC7ZZ RESTRICTION OF CERVIX, VIA NATURAL OR ARTIFICIAL OPENING: ICD-10-PCS | Performed by: OBSTETRICS & GYNECOLOGY

## 2023-01-19 PROCEDURE — 25010000002 AZITHROMYCIN PER 500 MG: Performed by: OBSTETRICS & GYNECOLOGY

## 2023-01-19 RX ORDER — ONDANSETRON 2 MG/ML
INJECTION INTRAMUSCULAR; INTRAVENOUS AS NEEDED
Status: DISCONTINUED | OUTPATIENT
Start: 2023-01-19 | End: 2023-01-19 | Stop reason: SURG

## 2023-01-19 RX ORDER — BUPIVACAINE HYDROCHLORIDE 7.5 MG/ML
INJECTION, SOLUTION INTRASPINAL AS NEEDED
Status: DISCONTINUED | OUTPATIENT
Start: 2023-01-19 | End: 2023-01-19 | Stop reason: SURG

## 2023-01-19 RX ORDER — OXYCODONE HYDROCHLORIDE 5 MG/1
5 TABLET ORAL ONCE
Status: COMPLETED | OUTPATIENT
Start: 2023-01-19 | End: 2023-01-19

## 2023-01-19 RX ORDER — MIDAZOLAM HYDROCHLORIDE 1 MG/ML
INJECTION INTRAMUSCULAR; INTRAVENOUS AS NEEDED
Status: DISCONTINUED | OUTPATIENT
Start: 2023-01-19 | End: 2023-01-19 | Stop reason: SURG

## 2023-01-19 RX ORDER — FENTANYL CITRATE 50 UG/ML
INJECTION, SOLUTION INTRAMUSCULAR; INTRAVENOUS AS NEEDED
Status: DISCONTINUED | OUTPATIENT
Start: 2023-01-19 | End: 2023-01-19 | Stop reason: SURG

## 2023-01-19 RX ORDER — ACETAMINOPHEN 500 MG
500 TABLET ORAL EVERY 6 HOURS PRN
Status: DISCONTINUED | OUTPATIENT
Start: 2023-01-19 | End: 2023-01-20 | Stop reason: HOSPADM

## 2023-01-19 RX ORDER — FAMOTIDINE 10 MG/ML
INJECTION, SOLUTION INTRAVENOUS AS NEEDED
Status: DISCONTINUED | OUTPATIENT
Start: 2023-01-19 | End: 2023-01-19 | Stop reason: SURG

## 2023-01-19 RX ORDER — TRISODIUM CITRATE DIHYDRATE AND CITRIC ACID MONOHYDRATE 500; 334 MG/5ML; MG/5ML
30 SOLUTION ORAL ONCE
Status: COMPLETED | OUTPATIENT
Start: 2023-01-19 | End: 2023-01-19

## 2023-01-19 RX ADMIN — FENTANYL CITRATE 20 MCG: 50 INJECTION, SOLUTION INTRAMUSCULAR; INTRAVENOUS at 07:49

## 2023-01-19 RX ADMIN — MIDAZOLAM 1 MG: 1 INJECTION INTRAMUSCULAR; INTRAVENOUS at 07:44

## 2023-01-19 RX ADMIN — HYDROXYZINE HYDROCHLORIDE 25 MG: 25 TABLET, FILM COATED ORAL at 21:43

## 2023-01-19 RX ADMIN — ACETAMINOPHEN 500 MG: 500 TABLET, FILM COATED ORAL at 15:30

## 2023-01-19 RX ADMIN — SODIUM CHLORIDE, POTASSIUM CHLORIDE, SODIUM LACTATE AND CALCIUM CHLORIDE 1000 ML/HR: 600; 310; 30; 20 INJECTION, SOLUTION INTRAVENOUS at 07:01

## 2023-01-19 RX ADMIN — AMPICILLIN SODIUM 2 G: 2 INJECTION, POWDER, FOR SOLUTION INTRAVENOUS at 14:28

## 2023-01-19 RX ADMIN — OXYCODONE HYDROCHLORIDE 5 MG: 5 TABLET ORAL at 17:21

## 2023-01-19 RX ADMIN — SODIUM CHLORIDE, POTASSIUM CHLORIDE, SODIUM LACTATE AND CALCIUM CHLORIDE 1000 ML/HR: 600; 310; 30; 20 INJECTION, SOLUTION INTRAVENOUS at 07:39

## 2023-01-19 RX ADMIN — INDOMETHACIN 25 MG: 25 CAPSULE ORAL at 16:35

## 2023-01-19 RX ADMIN — INDOMETHACIN 25 MG: 25 CAPSULE ORAL at 23:39

## 2023-01-19 RX ADMIN — AMPICILLIN SODIUM 2 G: 2 INJECTION, POWDER, FOR SOLUTION INTRAVENOUS at 19:38

## 2023-01-19 RX ADMIN — SODIUM CHLORIDE, POTASSIUM CHLORIDE, SODIUM LACTATE AND CALCIUM CHLORIDE 125 ML/HR: 600; 310; 30; 20 INJECTION, SOLUTION INTRAVENOUS at 16:39

## 2023-01-19 RX ADMIN — ONDANSETRON 4 MG: 2 INJECTION INTRAMUSCULAR; INTRAVENOUS at 16:35

## 2023-01-19 RX ADMIN — OXYCODONE HYDROCHLORIDE 5 MG: 5 TABLET ORAL at 10:33

## 2023-01-19 RX ADMIN — BUPIVACAINE HYDROCHLORIDE IN DEXTROSE 1.4 ML: 7.5 INJECTION, SOLUTION SUBARACHNOID at 07:49

## 2023-01-19 RX ADMIN — INDOMETHACIN 25 MG: 25 CAPSULE ORAL at 01:41

## 2023-01-19 RX ADMIN — ONDANSETRON 4 MG: 2 INJECTION INTRAMUSCULAR; INTRAVENOUS at 07:44

## 2023-01-19 RX ADMIN — SODIUM CHLORIDE, POTASSIUM CHLORIDE, SODIUM LACTATE AND CALCIUM CHLORIDE 125 ML/HR: 600; 310; 30; 20 INJECTION, SOLUTION INTRAVENOUS at 09:05

## 2023-01-19 RX ADMIN — AZITHROMYCIN 500 MG: 500 INJECTION, POWDER, LYOPHILIZED, FOR SOLUTION INTRAVENOUS at 15:28

## 2023-01-19 RX ADMIN — AMPICILLIN SODIUM 2 G: 2 INJECTION, POWDER, FOR SOLUTION INTRAVENOUS at 07:27

## 2023-01-19 RX ADMIN — FAMOTIDINE 20 MG: 10 INJECTION, SOLUTION INTRAVENOUS at 07:44

## 2023-01-19 RX ADMIN — SODIUM CITRATE AND CITRIC ACID MONOHYDRATE 30 ML: 500; 334 SOLUTION ORAL at 07:38

## 2023-01-19 RX ADMIN — AMPICILLIN SODIUM 2 G: 2 INJECTION, POWDER, FOR SOLUTION INTRAVENOUS at 01:41

## 2023-01-19 RX ADMIN — INDOMETHACIN 25 MG: 25 CAPSULE ORAL at 10:33

## 2023-01-19 NOTE — ANESTHESIA PROCEDURE NOTES
Spinal Block      Patient reassessed immediately prior to procedure    Patient location during procedure: OR  Indication:at surgeon's request  Performed By  CRNA/ARACELY: Sisi Browning CRNA  Preanesthetic Checklist  Completed: patient identified, IV checked, site marked, risks and benefits discussed, surgical consent, monitors and equipment checked, pre-op evaluation and timeout performed  Spinal Block Prep:  Patient Position:sitting  Sterile Tech:cap, gloves, mask and sterile barriers  Prep:Betadine  Patient Monitoring:blood pressure monitoring, continuous pulse oximetry and EKG    Spinal Block Procedure  Approach:midline  Guidance:palpation technique  Location:L3-L4  Needle Type:Buck  Needle Gauge:25 G  Placement of Spinal needle event:cerebrospinal fluid aspirated  Paresthesia: no  Fluid Appearance:clear     Post Assessment  Patient Tolerance:patient tolerated the procedure well with no apparent complications  Complications no

## 2023-01-19 NOTE — ANESTHESIA PREPROCEDURE EVALUATION
Anesthesia Evaluation     Patient summary reviewed and Nursing notes reviewed   NPO Solid Status: > 8 hours  NPO Liquid Status: > 2 hours           Airway   Mallampati: II  TM distance: >3 FB  Neck ROM: full  Dental      Pulmonary - negative pulmonary ROS   Cardiovascular - negative cardio ROS        Neuro/Psych- negative ROS  GI/Hepatic/Renal/Endo - negative ROS     Musculoskeletal (-) negative ROS    Abdominal    Substance History - negative use     OB/GYN    (+) Pregnant,         Other - negative ROS                       Anesthesia Plan    ASA 2     ITN and spinal       Anesthetic plan, risks, benefits, and alternatives have been provided, discussed and informed consent has been obtained with: patient.        CODE STATUS:    Code Status (Patient has no pulse and is not breathing): CPR (Attempt to Resuscitate)  Medical Interventions (Patient has pulse or is breathing): Full Support

## 2023-01-19 NOTE — ANESTHESIA POSTPROCEDURE EVALUATION
Patient: Nai Ramirez    Procedure Summary     Date: 01/19/23 [facility]  [facility]    Anesthesia Start: 0743 Anesthesia Stop: 0829    Procedure: CERVICAL CERCLAGE (Cervix) Diagnosis:     Surgeons: Maggi Pascal MD Provider: Laya Lala DO    Anesthesia Type: ITN, spinal ASA Status: 2          Anesthesia Type: ITN, spinal    Vitals  Vitals Value Taken Time   /60 01/19/23 0825   Temp 98 °F (36.7 °C) 01/19/23 0825   Pulse 60 01/19/23 0828   Resp 16 01/19/23 0825   SpO2 100 % 01/19/23 0828   Vitals shown include unvalidated device data.        Post Anesthesia Care and Evaluation    Patient location during evaluation: bedside  Patient participation: complete - patient participated  Level of consciousness: awake and alert  Pain management: adequate    Airway patency: patent  Anesthetic complications: No anesthetic complications    Cardiovascular status: acceptable  Respiratory status: acceptable  Hydration status: acceptable

## 2023-01-20 VITALS
DIASTOLIC BLOOD PRESSURE: 62 MMHG | HEIGHT: 63 IN | OXYGEN SATURATION: 100 % | BODY MASS INDEX: 30.65 KG/M2 | WEIGHT: 173 LBS | RESPIRATION RATE: 18 BRPM | HEART RATE: 90 BPM | TEMPERATURE: 98.2 F | SYSTOLIC BLOOD PRESSURE: 111 MMHG

## 2023-01-20 PROCEDURE — 25010000002 AMPICILLIN PER 500 MG: Performed by: OBSTETRICS & GYNECOLOGY

## 2023-01-20 PROCEDURE — 99238 HOSP IP/OBS DSCHRG MGMT 30/<: CPT | Performed by: OBSTETRICS & GYNECOLOGY

## 2023-01-20 RX ORDER — POLYETHYLENE GLYCOL 3350 17 G/17G
17 POWDER, FOR SOLUTION ORAL DAILY
Status: DISCONTINUED | OUTPATIENT
Start: 2023-01-20 | End: 2023-01-20 | Stop reason: HOSPADM

## 2023-01-20 RX ORDER — DOCUSATE SODIUM 100 MG/1
100 CAPSULE, LIQUID FILLED ORAL 2 TIMES DAILY
Status: DISCONTINUED | OUTPATIENT
Start: 2023-01-20 | End: 2023-01-20 | Stop reason: HOSPADM

## 2023-01-20 RX ADMIN — INDOMETHACIN 25 MG: 25 CAPSULE ORAL at 05:11

## 2023-01-20 RX ADMIN — AMPICILLIN SODIUM 2 G: 2 INJECTION, POWDER, FOR SOLUTION INTRAVENOUS at 08:16

## 2023-01-20 RX ADMIN — AMPICILLIN SODIUM 2 G: 2 INJECTION, POWDER, FOR SOLUTION INTRAVENOUS at 02:13

## 2023-01-20 RX ADMIN — DOCUSATE SODIUM 100 MG: 100 CAPSULE, LIQUID FILLED ORAL at 08:25

## 2023-01-20 RX ADMIN — INDOMETHACIN 25 MG: 25 CAPSULE ORAL at 11:03

## 2023-01-20 RX ADMIN — POLYETHYLENE GLYCOL 3350 17 G: 17 POWDER, FOR SOLUTION ORAL at 08:25

## 2023-01-20 NOTE — ANESTHESIA POSTPROCEDURE EVALUATION
Patient: Nai Ramirez    Procedure Summary     Date: 01/19/23 Room / Location: [facility] LABOR DELIVERY 1 /  [facility]    Anesthesia Start: 0743 Anesthesia Stop: 0829    Procedure: CERVICAL CERCLAGE (Cervix) Diagnosis:     Surgeons: Maggi Pascal MD Provider: Laya Lala DO    Anesthesia Type: ITN, spinal ASA Status: 2          Anesthesia Type: ITN, spinal    Vitals  Vitals Value Taken Time   /62 01/20/23 0802   Temp 98.4 °F (36.9 °C) 01/20/23 0327   Pulse 90 01/20/23 0802   Resp 16 01/20/23 0327   SpO2 98 % 01/19/23 0947   Vitals shown include unvalidated device data.        Post Anesthesia Care and Evaluation    Patient location during evaluation: bedside  Patient participation: complete - patient participated  Level of consciousness: awake and alert  Pain management: adequate    Airway patency: patent  Anesthetic complications: No anesthetic complications    Cardiovascular status: acceptable  Respiratory status: acceptable  Hydration status: acceptable  Post Neuraxial Block status: Motor and sensory function returned to baseline and No signs or symptoms of PDPH

## 2023-01-23 DIAGNOSIS — N88.8 CERVICAL FUNNELING: ICD-10-CM

## 2023-01-23 DIAGNOSIS — O34.30 CERVICAL CERCLAGE SUTURE PRESENT, ANTEPARTUM: ICD-10-CM

## 2023-01-23 DIAGNOSIS — O26.872 CERVICAL SHORTENING AFFECTING PREGNANCY IN SECOND TRIMESTER: Primary | ICD-10-CM

## 2023-01-28 ENCOUNTER — DOCUMENTATION (OUTPATIENT)
Dept: OBSTETRICS AND GYNECOLOGY | Facility: CLINIC | Age: 23
End: 2023-01-28
Payer: COMMERCIAL

## 2023-01-28 DIAGNOSIS — O26.892 VAGINAL DISCHARGE DURING PREGNANCY IN SECOND TRIMESTER: ICD-10-CM

## 2023-01-28 DIAGNOSIS — N89.8 VAGINAL DISCHARGE DURING PREGNANCY IN SECOND TRIMESTER: ICD-10-CM

## 2023-01-28 DIAGNOSIS — N89.8 VAGINAL ITCHING: Primary | ICD-10-CM

## 2023-01-28 NOTE — PROGRESS NOTES
, 23w4d. S/p cervical cerclage 2023. Patient called w/ c/o vaginal discharge (thick, yellow), vulvar/vaginal  itching, foul smelling d/c and a red spotty rash on the right side of her vagina that she first noticed this morning. She said she has been inserting progesterone capsules vaginally QHS for approximately 3 weeks. Her discharge with progesterone capsules has been watery but has changed. She denies DFM, LOF, cramping, contractions & vaginal bleeding. Patient has an appointment scheduled with PDC Monday morning. Patient advised to wait until her appt Monday for evaluation and treatment if symptoms are not severe. Patient advised to call for LOF, vag bleeding, DFM, or signs of labor. Pt NILSON.

## 2023-01-30 ENCOUNTER — HOSPITAL ENCOUNTER (OUTPATIENT)
Dept: WOMENS IMAGING | Facility: HOSPITAL | Age: 23
Discharge: HOME | End: 2023-01-30
Admitting: OBSTETRICS & GYNECOLOGY
Payer: COMMERCIAL

## 2023-01-30 ENCOUNTER — OFFICE VISIT (OUTPATIENT)
Dept: OBSTETRICS AND GYNECOLOGY | Facility: HOSPITAL | Age: 23
End: 2023-01-30
Payer: COMMERCIAL

## 2023-01-30 VITALS — BODY MASS INDEX: 31.04 KG/M2 | DIASTOLIC BLOOD PRESSURE: 74 MMHG | WEIGHT: 175.2 LBS | SYSTOLIC BLOOD PRESSURE: 113 MMHG

## 2023-01-30 DIAGNOSIS — O34.30 CERVICAL CERCLAGE SUTURE PRESENT, ANTEPARTUM: ICD-10-CM

## 2023-01-30 DIAGNOSIS — Z3A.23 23 WEEKS GESTATION OF PREGNANCY: ICD-10-CM

## 2023-01-30 DIAGNOSIS — N88.8 CERVICAL FUNNELING: Primary | ICD-10-CM

## 2023-01-30 DIAGNOSIS — O26.872 CERVICAL SHORTENING AFFECTING PREGNANCY IN SECOND TRIMESTER: ICD-10-CM

## 2023-01-30 DIAGNOSIS — N88.8 CERVICAL FUNNELING: ICD-10-CM

## 2023-01-30 PROCEDURE — 76815 OB US LIMITED FETUS(S): CPT | Performed by: OBSTETRICS & GYNECOLOGY

## 2023-01-30 PROCEDURE — 76817 TRANSVAGINAL US OBSTETRIC: CPT | Performed by: OBSTETRICS & GYNECOLOGY

## 2023-01-30 PROCEDURE — 76817 TRANSVAGINAL US OBSTETRIC: CPT

## 2023-01-30 PROCEDURE — 76815 OB US LIMITED FETUS(S): CPT

## 2023-01-30 RX ORDER — FLUCONAZOLE 150 MG/1
150 TABLET ORAL ONCE
Qty: 1 TABLET | Refills: 1 | Status: SHIPPED | OUTPATIENT
Start: 2023-01-30 | End: 2023-01-30

## 2023-02-01 ENCOUNTER — ROUTINE PRENATAL (OUTPATIENT)
Dept: OBSTETRICS AND GYNECOLOGY | Facility: CLINIC | Age: 23
End: 2023-02-01
Payer: COMMERCIAL

## 2023-02-01 VITALS — SYSTOLIC BLOOD PRESSURE: 114 MMHG | BODY MASS INDEX: 31.35 KG/M2 | WEIGHT: 177 LBS | DIASTOLIC BLOOD PRESSURE: 74 MMHG

## 2023-02-01 DIAGNOSIS — O99.330 TOBACCO USE DURING PREGNANCY, ANTEPARTUM: ICD-10-CM

## 2023-02-01 DIAGNOSIS — Z3A.24 24 WEEKS GESTATION OF PREGNANCY: Primary | ICD-10-CM

## 2023-02-01 DIAGNOSIS — N88.8 CERVICAL FUNNELING: ICD-10-CM

## 2023-02-01 DIAGNOSIS — O26.872 CERVICAL SHORTENING AFFECTING PREGNANCY IN SECOND TRIMESTER: ICD-10-CM

## 2023-02-01 LAB
EXPIRATION DATE: 0
GLUCOSE UR STRIP-MCNC: NEGATIVE MG/DL
Lab: 0
PROT UR STRIP-MCNC: NEGATIVE MG/DL

## 2023-02-01 PROCEDURE — 0502F SUBSEQUENT PRENATAL CARE: CPT | Performed by: OBSTETRICS & GYNECOLOGY

## 2023-02-01 RX ORDER — PROGESTERONE 100 MG/1
100 CAPSULE ORAL DAILY
Qty: 30 CAPSULE | Refills: 6 | Status: SHIPPED | OUTPATIENT
Start: 2023-02-01 | End: 2023-02-14 | Stop reason: SDUPTHER

## 2023-02-01 NOTE — PROGRESS NOTES
OB FOLLOW UP  CC- Here for care of pregnancy        Nai Ramirez is a 22 y.o.  24w1d patient being seen today for her obstetrical follow up visit. Patient reports nausea after drinking something sugary.    Her prenatal care is complicated by (and status) : None  Patient Active Problem List   Diagnosis   • Pregnancy   • Tobacco use during pregnancy, antepartum   • Cervical funneling   • Cervical shortening affecting pregnancy in second trimester       Flu Status: Declines  Ultrasound Today: No    ROS -   Patient Reports : nausea after drinking a sugary drink  Patient Denies: Loss of Fluid, Vaginal Spotting and Vision Changes  Fetal Movement : normal  All other systems reviewed and are negative.       The additional following portions of the patient's history were reviewed and updated as appropriate: allergies, current medications, past family history, past medical history, past social history, past surgical history and problem list.    I have reviewed and agree with the HPI, ROS, and historical information as entered above. Maggi Collins MD    /74   Wt 80.3 kg (177 lb)   LMP 2022   BMI 31.35 kg/m²       EXAM:     Prenatal Vitals  BP: 114/74  Weight: 80.3 kg (177 lb)   Fetal Heart Rate: +      Fundal Height (cm): 24 cm        Urine Glucose Read-only: Negative  Urine Protein Read-only: Negative       Assessment and Plan    Problem List Items Addressed This Visit        Gynecologic and Obstetric Problems    Cervical funneling    Overview     21 wks. CL 2.9cm, but small funnel seen 20 and 21 wks. Vaginal progesterone 100mg, refer PDC         Cervical shortening affecting pregnancy in second trimester    Overview     S/p cerclage 22 wks            Other    Pregnancy - Primary    Overview     cfDNA low risk, AFP neg.          Relevant Orders    POC Protein, Urine, Qualitative, Dipstick (Completed)    POC Glucose, Urine, Qualitative, Dipstick (Completed)    Tobacco use during pregnancy,  antepartum       1. Pregnancy at 24w1d. Had cerclage 2 weeks ago, and then FU with dr caban last week, and CL was 2.4-2.6 above the stitch. She is not having any contractions or bleeding and feels well. Needs refill of progesterone.   2. Fetal status reassuring.  3. No US indicated today.  4. 1 hour gtt, CBC, Antibody screen and TDAP next visit. Instructions given  5. Discussed/encouraged TDAP vaccination after 28 weeks  6. Activity and Exercise discussed.  Return in about 1 month (around 3/1/2023) for F/U Prenatal, and glucola.    Maggi Collins MD  02/01/2023

## 2023-02-02 NOTE — PROCEDURES
Informed consent obtained. Patient taken to the OR where spinal anesthesia was placed without difficulty. She was then placed in the dorsal lithotomy position and prepped and draped in the usual fashion.     Weighted speculum was placed and the cervix was visualized. A second deeper prep was done with sponge sticks and diluted hibiclens, making sure to clean the posterior and anterior fornices. Right angle retractor was placed anteriorly and the cervix was clearly visualized. Ring forceps were used x 2 to  the anterior and posterior lip of the cervix.     Right angle retractors were used anteriorly and to the patient's right within the vagina to increase visualization of the cervix. 5mm mersilene tape was then used to place a circumferential stitch in the cervical stroma anterior to the bladder reflection. Five stitches were used with the final stitch terminating approximately 1cm posterior to the first stitch. The stitch was pulled tight and eight knots were placed. The suture was then trimmed to approximately 1inch in length.     An exam was performed to confirm that the cervix was closed. Straight cath was done with clear urine noted.     All instruments were removed and the patient was taken to the recovery room awake and in stable condition.     I was present and scrubbed for the entire length of the procedure.     Maggi Pascal MD

## 2023-02-14 ENCOUNTER — TELEPHONE (OUTPATIENT)
Dept: OBSTETRICS AND GYNECOLOGY | Facility: CLINIC | Age: 23
End: 2023-02-14
Payer: COMMERCIAL

## 2023-02-14 ENCOUNTER — HOSPITAL ENCOUNTER (OUTPATIENT)
Facility: HOSPITAL | Age: 23
Discharge: HOME | End: 2023-02-14
Attending: OBSTETRICS & GYNECOLOGY | Admitting: OBSTETRICS & GYNECOLOGY
Payer: COMMERCIAL

## 2023-02-14 VITALS
DIASTOLIC BLOOD PRESSURE: 77 MMHG | HEIGHT: 63 IN | WEIGHT: 177 LBS | TEMPERATURE: 98 F | SYSTOLIC BLOOD PRESSURE: 112 MMHG | RESPIRATION RATE: 16 BRPM | BODY MASS INDEX: 31.36 KG/M2

## 2023-02-14 DIAGNOSIS — O26.872 CERVICAL SHORTENING AFFECTING PREGNANCY IN SECOND TRIMESTER: ICD-10-CM

## 2023-02-14 DIAGNOSIS — O34.30 CERVICAL CERCLAGE SUTURE PRESENT, ANTEPARTUM: Primary | ICD-10-CM

## 2023-02-14 LAB
BACTERIA UR QL AUTO: ABNORMAL /HPF
BILIRUB UR QL STRIP: NEGATIVE
CLARITY UR: CLEAR
COLOR UR: YELLOW
GLUCOSE UR STRIP-MCNC: NEGATIVE MG/DL
HGB UR QL STRIP.AUTO: NEGATIVE
HYALINE CASTS UR QL AUTO: ABNORMAL /LPF
KETONES UR QL STRIP: NEGATIVE
LEUKOCYTE ESTERASE UR QL STRIP.AUTO: ABNORMAL
NITRITE UR QL STRIP: NEGATIVE
PH UR STRIP.AUTO: 6.5 [PH] (ref 5–8)
PROT UR QL STRIP: NEGATIVE
RBC # UR STRIP: ABNORMAL /HPF
REF LAB TEST METHOD: ABNORMAL
SP GR UR STRIP: 1.02 (ref 1–1.03)
SQUAMOUS #/AREA URNS HPF: ABNORMAL /HPF
UROBILINOGEN UR QL STRIP: ABNORMAL
WBC # UR STRIP: ABNORMAL /HPF

## 2023-02-14 PROCEDURE — G0463 HOSPITAL OUTPT CLINIC VISIT: HCPCS

## 2023-02-14 PROCEDURE — 99213 OFFICE O/P EST LOW 20 MIN: CPT | Performed by: OBSTETRICS & GYNECOLOGY

## 2023-02-14 PROCEDURE — 59025 FETAL NON-STRESS TEST: CPT | Performed by: OBSTETRICS & GYNECOLOGY

## 2023-02-14 PROCEDURE — 59025 FETAL NON-STRESS TEST: CPT

## 2023-02-14 PROCEDURE — 81001 URINALYSIS AUTO W/SCOPE: CPT | Performed by: OBSTETRICS & GYNECOLOGY

## 2023-02-14 PROCEDURE — 87086 URINE CULTURE/COLONY COUNT: CPT | Performed by: OBSTETRICS & GYNECOLOGY

## 2023-02-14 RX ORDER — PROGESTERONE 100 MG/1
100 CAPSULE ORAL DAILY
Qty: 30 CAPSULE | Refills: 6 | Status: SHIPPED | OUTPATIENT
Start: 2023-02-14

## 2023-02-14 RX ORDER — AMOXICILLIN 875 MG/1
875 TABLET, COATED ORAL 2 TIMES DAILY
COMMUNITY
End: 2023-02-14 | Stop reason: HOSPADM

## 2023-02-14 NOTE — TELEPHONE ENCOUNTER
Caller: Nai Ramirez    Relationship: Self    Best call back number:[phone]    Requested Prescriptions:   Requested Prescriptions     Pending Prescriptions Disp Refills   • Progesterone (Prometrium) 100 MG capsule 30 capsule 6     Sig: Insert 1 capsule into the vagina Daily.        Pharmacy where request should be sent:[facility]/pharmacy #[id number] - [address] AT [place] [address] - [phone]  - [phone]   [phone]      Additional details provided by patient: PT IS EXPERIENCING SOME ABNORMAL BLEEDING AND CRAMPING, LEFT SIDE HEAD ACHE AS WELL, PELVIC PAIN    Does the patient have less than a 3 day supply:  [x] Yes  [] No    Would you like a call back once the refill request has been completed: [x] Yes [] No    If the office needs to give you a call back, can they leave a voicemail: [x] Yes [] No

## 2023-02-14 NOTE — H&P
[facility]  Obstetric History and Physical    Chief Complaint   Patient presents with   • Abdominal Cramping       Subjective     Patient is a 22 y.o. female  currently at 26w0d, who presents with complaints of lower abdominal cramping and vaginal pain.  The patient is status post Oakes cervical cerclage placement per Dr. Pascal on 2023.  The patient denies dysuria or hannah vaginal bleeding.  She states she wiped this morning and may have noted some blood on her toilet tissue.  She denies leakage of fluid.  She states her vaginal tenderness was such that she had difficulty inserting her progesterone suppository this morning.    Her prenatal care is notable for history of short cervical length treated with Oakes cervical cerclage placement. Her previous obstetric/gynecological history is noncontributory.    The following portions of the patients history were reviewed and updated as appropriate: current medications, allergies, past medical history, past surgical history, past family history, past social history and problem list .        Prenatal Information:   Maternal Prenatal Labs  Blood Type No results found for: ABO   Rh Status No results found for: RH   Antibody Screen No results found for: ABSCRN   Gonnorhea No results found for: GCCX   Chlamydia No results found for: CLAMYDCU   RPR No results found for: RPR   Syphilis Antibody No results found for: SYPHILIS   Rubella No results found for: RUBELLAIGGIN   Hepatitis B Surface Antigen No results found for: HEPBSAG   HIV-1 Antibody No results found for: LABHIV1   Hepatitis C Antibody No results found for: HEPCAB   Rapid Urin Drug Screen No results found for: AMPMETHU, BARBITSCNUR, LABBENZSCN, LABMETHSCN, LABOPIASCN, THCURSCR, COCAINEUR, AMPHETSCREEN, PROPOXSCN, BUPRENORSCNU, METAMPSCNUR, OXYCODONESCN, TRICYCLICSCN   Group B Strep Culture No results found for: GBSANTIGEN           External Prenatal Results     Pregnancy Outside Results -  Transcribed From Office Records - See Scanned Records For Details     Test Value Date Time    ABO  O  23    Rh  Positive  23 1953    Antibody Screen  Negative  23 1304       Negative  10/12/22 1020    Varicella IgG       Rubella  1.80 index 10/12/22 1020    Hgb  12.5 g/dL 23 1304       14.0 g/dL 10/12/22 1020    Hct  35.6 % 23 1304       41.1 % 10/12/22 1020    Glucose Fasting GTT       Glucose Tolerance Test 1 hour       Glucose Tolerance Test 3 hour       Gonorrhea (discrete)       Chlamydia (discrete)       RPR  Non Reactive  10/12/22 1020    VDRL       Syphilis Antibody       HBsAg  Negative  10/12/22 1020    Herpes Simplex Virus PCR       Herpes Simplex VIrus Culture       HIV  Non Reactive  10/12/22 1020    Hep C RNA Quant PCR       Hep C Antibody  <0.1 s/co ratio 10/12/22 1020    AFP  24.4 ng/mL 22 1056    Group B Strep       GBS Susceptibility to Clindamycin       GBS Susceptibility to Erythromycin       Fetal Fibronectin       Genetic Testing, Maternal Blood             Drug Screening     Test Value Date Time    Urine Drug Screen       Amphetamine Screen  Negative ng/mL 22 1030       Negative ng/mL 10/12/22 1030    Barbiturate Screen  Negative ng/mL 22 1030       Negative ng/mL 10/12/22 1030    Benzodiazepine Screen  Negative ng/mL 22 1030       Negative ng/mL 10/12/22 1030    Methadone Screen  Negative ng/mL 22 1030       Negative ng/mL 10/12/22 1030    Phencyclidine Screen  Negative ng/mL 22 1030       Negative ng/mL 10/12/22 1030    Opiates Screen       THC Screen       Cocaine Screen       Propoxyphene Screen  Negative ng/mL 22 1030       Negative ng/mL 10/12/22 1030    Buprenorphine Screen       Methamphetamine Screen       Oxycodone Screen       Tricyclic Antidepressants Screen             Legend    ^: Historical                          Past OB History:       OB History    Para Term  AB Living   1 0 0 0 0 0    SAB IAB Ectopic Molar Multiple Live Births   0 0 0 0 0 0      # Outcome Date GA Lbr Vlad/2nd Weight Sex Delivery Anes PTL Lv   1 Current               Obstetric Comments   FOB #1 : Pregnancy #1 (NIPS = negative, female)        Past Medical History:  Past Medical History:   Diagnosis Date   • Anxiety 2022    self diagnosed; never treated   • History of asthma     in childhood   • Urinary tract infection    • Yeast infection       Past Surgical History Past Surgical History:   Procedure Laterality Date   • CERVICAL CERCLAGE N/A 1/19/2023    Procedure: CERVICAL CERCLAGE;  Surgeon: Maggi Pascal MD;  Location: [facility] LABOR DELIVERY;  Service: Gynecology;  Laterality: N/A;      Family History: Family History   Problem Relation Age of Onset   • Breast cancer Neg Hx    • Ovarian cancer Neg Hx    • Uterine cancer Neg Hx    • Colon cancer Neg Hx       Social History:  reports that she quit smoking about 5 months ago. Her smoking use included cigarettes. She uses smokeless tobacco.   reports that she does not currently use alcohol.   reports that she does not currently use drugs after having used the following drugs: Marijuana.   Allergies: Omnicef [cefdinir]  Current Medications:          No current facility-administered medications on file prior to encounter.     Current Outpatient Medications on File Prior to Encounter   Medication Sig Dispense Refill   • acetaminophen (TYLENOL) 500 MG tablet Take 500 mg by mouth Every 6 (Six) Hours As Needed for Mild Pain.     • amoxicillin (AMOXIL) 875 MG tablet Take 875 mg by mouth 2 (Two) Times a Day. For dental abcess     • Prenatal Vit-Fe Fumarate-FA (PRENATAL VITAMIN PO) Take  by mouth.     • Progesterone (Prometrium) 100 MG capsule Insert 1 capsule into the vagina Daily. 30 capsule 6   • [DISCONTINUED] Progesterone (Prometrium) 100 MG capsule Insert 1 capsule into the vagina Daily. 30 capsule 6       General ROS: Pertinent items are noted in HPI, all other systems reviewed  and negative    Objective       Vital Signs Range for the last 24 hours  Temperature: Temp:  [98 °F (36.7 °C)] 98 °F (36.7 °C)   Temp Source: Temp src: Oral   BP: BP: (112)/(77) 112/77   Pulse:     Respirations: Resp:  [16] 16   SPO2:     O2 Amount (l/min):     O2 Devices     Weight: Weight:  [80.3 kg (177 lb)] 80.3 kg (177 lb)     Physical Examination: General appearance - alert, well appearing, and in no distress, oriented to person, place, and time and overweight  Mental status - alert, oriented to person, place, and time, normal mood, behavior, speech, dress, motor activity, and thought processes  Eyes - pupils equal and reactive, extraocular eye movements intact, sclera anicteric  Neck - supple, no significant adenopathy, thyroid exam: thyroid is normal in size without nodules or tenderness  Chest - clear to auscultation, no wheezes, rales or rhonchi, symmetric air entry  Heart - normal rate, regular rhythm, normal S1, S2, no murmurs, rubs, clicks or gallops  Abdomen-soft, nontender, nondistended, no masses or organomegaly   Abdomen, Non-Tender  Pelvic - VULVA: normal appearing vulva with no masses, tenderness or lesions, CERVIX: Closed/long.  The cervix remains posterior and firm.  Oakes cervical cerclage in place.  Neurological - alert, oriented, normal speech, no focal findings or movement disorder noted, DTR's normal and symmetric  Extremities - no pedal edema noted    Fetal Heart Rate Assessment  Indication: Pelvic pain in pregnancy   Start Time: 1115                end Time: 1225   NST Results: NST reactive and appropriate for gestational age.  Fetal heart rate baseline 130-140 bpm.  Average variability with 10 x 10 accelerations noted.  No decelerations.  No contractions noted.        Laboratory Results:   No results found for: ALKPHOS, ALT, AST, CREATININE, BILITOT, LDH, URICACID    No results found for: WBC, RBC, HGB, HCT, MCV, MCH, MCHC, RDW, RDWSD, MPV, PLT, NEUTRORELPCT, LYMPHORELPCT,  "MONORELPCT, EOSRELPCT, BASORELPCT, AUTOIGPER, NEUTROABS, LYMPHSABS, MONOSABS, EOSABS, BASOSABS, AUTOIGNUM, NRBC          Brief Urine Lab Results  (Last result in the past 365 days)      Color   Clarity   Blood   Leuk Est   Nitrite   Protein   CREAT   Urine HCG        23 1154 Yellow   Clear   Negative   Small (1+)   Negative   Negative                   Radiology Review: No new studies  Other Studies: Urinalysis as above, no evidence of UTI    Assessment & Plan       * No active hospital problems. *        Assessment:  1. Pelvic pain in pregnancy.  No evidence of  labor or  cervical dilation.  2. Status post Oakes cervical cerclage.    Plan:  1. Discharge home.  2. Keep regularly scheduled OB office visit.     Total time spent today with Nai  was 20-29 minutes (level 3).  Of this time, > 50% was spent face-to-face time coordinating care, answering her questions and counseling regarding pathophysiology of her presenting problem along with plans for any diagnostic work-up and treatment.        Shashank Carter \"[place]\" DELMI Jones MD  2023  12:35 EST    "

## 2023-02-15 LAB — BACTERIA SPEC AEROBE CULT: NO GROWTH

## 2023-02-27 ENCOUNTER — HOSPITAL ENCOUNTER (OUTPATIENT)
Dept: WOMENS IMAGING | Facility: HOSPITAL | Age: 23
Discharge: HOME | End: 2023-02-27
Admitting: OBSTETRICS & GYNECOLOGY
Payer: COMMERCIAL

## 2023-02-27 ENCOUNTER — OFFICE VISIT (OUTPATIENT)
Dept: OBSTETRICS AND GYNECOLOGY | Facility: HOSPITAL | Age: 23
End: 2023-02-27
Payer: COMMERCIAL

## 2023-02-27 VITALS — WEIGHT: 183.5 LBS | DIASTOLIC BLOOD PRESSURE: 64 MMHG | BODY MASS INDEX: 32.51 KG/M2 | SYSTOLIC BLOOD PRESSURE: 118 MMHG

## 2023-02-27 DIAGNOSIS — Z3A.27 27 WEEKS GESTATION OF PREGNANCY: ICD-10-CM

## 2023-02-27 DIAGNOSIS — O34.30 CERVICAL CERCLAGE SUTURE PRESENT, ANTEPARTUM: ICD-10-CM

## 2023-02-27 DIAGNOSIS — O26.872 CERVICAL SHORTENING AFFECTING PREGNANCY IN SECOND TRIMESTER: ICD-10-CM

## 2023-02-27 DIAGNOSIS — N88.8 CERVICAL FUNNELING: Primary | ICD-10-CM

## 2023-02-27 DIAGNOSIS — N88.8 CERVICAL FUNNELING: ICD-10-CM

## 2023-02-27 PROCEDURE — 76816 OB US FOLLOW-UP PER FETUS: CPT

## 2023-02-27 PROCEDURE — 76816 OB US FOLLOW-UP PER FETUS: CPT | Performed by: OBSTETRICS & GYNECOLOGY

## 2023-03-01 ENCOUNTER — ROUTINE PRENATAL (OUTPATIENT)
Dept: OBSTETRICS AND GYNECOLOGY | Facility: CLINIC | Age: 23
End: 2023-03-01
Payer: COMMERCIAL

## 2023-03-01 VITALS — WEIGHT: 184 LBS | SYSTOLIC BLOOD PRESSURE: 110 MMHG | BODY MASS INDEX: 32.59 KG/M2 | DIASTOLIC BLOOD PRESSURE: 70 MMHG

## 2023-03-01 DIAGNOSIS — O34.30 CERVICAL CERCLAGE SUTURE PRESENT, ANTEPARTUM: ICD-10-CM

## 2023-03-01 DIAGNOSIS — Z13.1 SCREENING FOR DIABETES MELLITUS: Primary | ICD-10-CM

## 2023-03-01 DIAGNOSIS — Z3A.28 28 WEEKS GESTATION OF PREGNANCY: ICD-10-CM

## 2023-03-01 DIAGNOSIS — O99.330 TOBACCO USE DURING PREGNANCY, ANTEPARTUM: ICD-10-CM

## 2023-03-01 DIAGNOSIS — O26.872 CERVICAL SHORTENING AFFECTING PREGNANCY IN SECOND TRIMESTER: ICD-10-CM

## 2023-03-01 DIAGNOSIS — N88.8 CERVICAL FUNNELING: ICD-10-CM

## 2023-03-01 LAB
EXPIRATION DATE: 1
GLUCOSE UR STRIP-MCNC: NEGATIVE MG/DL
Lab: 1
PROT UR STRIP-MCNC: NEGATIVE MG/DL

## 2023-03-01 PROCEDURE — 0502F SUBSEQUENT PRENATAL CARE: CPT | Performed by: OBSTETRICS & GYNECOLOGY

## 2023-03-01 NOTE — PROGRESS NOTES
OB FOLLOW UP  CC- Here for care of pregnancy        Nai Ramirez is a 22 y.o.  28w1d patient being seen today for her obstetrical follow up. Patient reports no complaints.. patient seen PDC 2023 per patient everything looked good      Patient undergoing Glucola testing today. She is due for her testing at 1155.       MBT: O+  Rhogam: na  28 week packet: reviewed with patient  and counseled on fetal movement   TDAP: declines  Flu Status: Declines  Ultrasound Today: No    Her prenatal care is complicated by (and status) :  None  Patient Active Problem List   Diagnosis   • Pregnancy   • Tobacco use during pregnancy, antepartum   • Cervical funneling   • Cervical shortening affecting pregnancy in second trimester   • Cervical cerclage suture present, antepartum         ROS -   Patient Reports : No Problems  Patient Denies: Loss of Fluid, Vaginal Spotting, Vision Changes, Headaches, Nausea , Vomiting  and Contractions  Fetal Movement : normal    The additional following portions of the patient's history were reviewed and updated as appropriate: allergies and current medications.    I have reviewed and agree with the HPI, ROS, and historical information as entered above. Maggi Collins MD    /70   Wt 83.5 kg (184 lb)   LMP 2022   BMI 32.59 kg/m²         EXAM:     Prenatal Vitals  BP: 110/70  Weight: 83.5 kg (184 lb)   Fetal Heart Rate: +               Urine Glucose Read-only: Negative  Urine Protein Read-only: Negative       Assessment and Plan    Problem List Items Addressed This Visit        Gynecologic and Obstetric Problems    Cervical funneling    Overview     21 wks. CL 2.9cm, but small funnel seen 20 and 21 wks. Vaginal progesterone 100mg, refer PDC         Cervical shortening affecting pregnancy in second trimester    Overview     S/p cerclage 22 wks         Relevant Medications    Progesterone (Prometrium) 100 MG capsule    Cervical cerclage suture present, antepartum     Relevant Medications    Progesterone (Prometrium) 100 MG capsule       Other    Pregnancy    Overview     cfDNA low risk, AFP neg.          Relevant Orders    POC Glucose, Urine, Qualitative, Dipstick (Completed)    POC Protein, Urine, Qualitative, Dipstick (Completed)    Tobacco use during pregnancy, antepartum   Other Visit Diagnoses     Screening for diabetes mellitus    -  Primary    Relevant Orders    Antibody Screen    CBC (No Diff)    Gestational Screen 1 Hr ([facility])          1. Pregnancy at 28w1d. Had PDC US last week and CL was 3cm, and normal fetal growth and fluid.   2. 1 hr Glucola, CBC, and antibody screen today  and TDAP given today  3. Fetal movement/PTL or Labor precautions  4. Activity and Exercise discussed.  Return in about 1 month (around 4/1/2023) for F/U Prenatal.    Maggi Collins MD  03/01/2023

## 2023-03-02 ENCOUNTER — TELEPHONE (OUTPATIENT)
Dept: OBSTETRICS AND GYNECOLOGY | Facility: CLINIC | Age: 23
End: 2023-03-02
Payer: COMMERCIAL

## 2023-03-02 LAB
BLD GP AB SCN SERPL QL: NEGATIVE
ERYTHROCYTE [DISTWIDTH] IN BLOOD BY AUTOMATED COUNT: 11.9 % (ref 12.3–15.4)
GLUCOSE 1H P 50 G GLC PO SERPL-MCNC: 111 MG/DL (ref 65–139)
HCT VFR BLD AUTO: 31.9 % (ref 34–46.6)
HGB BLD-MCNC: 11.3 G/DL (ref 12–15.9)
MCH RBC QN AUTO: 34.2 PG (ref 26.6–33)
MCHC RBC AUTO-ENTMCNC: 35.4 G/DL (ref 31.5–35.7)
MCV RBC AUTO: 96.7 FL (ref 79–97)
PLATELET # BLD AUTO: 214 10*3/MM3 (ref 140–450)
RBC # BLD AUTO: 3.3 10*6/MM3 (ref 3.77–5.28)
WBC # BLD AUTO: 10.44 10*3/MM3 (ref 3.4–10.8)

## 2023-03-02 NOTE — TELEPHONE ENCOUNTER
Pt called, asking about glucose screening results from yesterday. Results are back but have not yet been reviewed by Dr Collins. Advised pt her level was WNL. PT NILSON.      -Janusz Wakefield

## 2023-03-27 ENCOUNTER — OFFICE VISIT (OUTPATIENT)
Dept: OBSTETRICS AND GYNECOLOGY | Facility: HOSPITAL | Age: 23
End: 2023-03-27
Payer: COMMERCIAL

## 2023-03-27 ENCOUNTER — HOSPITAL ENCOUNTER (OUTPATIENT)
Dept: WOMENS IMAGING | Facility: HOSPITAL | Age: 23
Discharge: HOME | End: 2023-03-27
Admitting: OBSTETRICS & GYNECOLOGY
Payer: COMMERCIAL

## 2023-03-27 VITALS — BODY MASS INDEX: 33.05 KG/M2 | DIASTOLIC BLOOD PRESSURE: 70 MMHG | WEIGHT: 186.6 LBS | SYSTOLIC BLOOD PRESSURE: 117 MMHG

## 2023-03-27 DIAGNOSIS — O34.30 CERVICAL CERCLAGE SUTURE PRESENT, ANTEPARTUM: ICD-10-CM

## 2023-03-27 DIAGNOSIS — Z34.90 PREGNANCY, UNSPECIFIED GESTATIONAL AGE: ICD-10-CM

## 2023-03-27 DIAGNOSIS — N88.8 CERVICAL FUNNELING: ICD-10-CM

## 2023-03-27 DIAGNOSIS — O34.30 CERVICAL CERCLAGE SUTURE PRESENT, ANTEPARTUM: Primary | ICD-10-CM

## 2023-03-27 DIAGNOSIS — O99.330 TOBACCO USE DURING PREGNANCY, ANTEPARTUM: ICD-10-CM

## 2023-03-27 DIAGNOSIS — O26.872 CERVICAL SHORTENING AFFECTING PREGNANCY IN SECOND TRIMESTER: ICD-10-CM

## 2023-03-27 PROCEDURE — 76819 FETAL BIOPHYS PROFIL W/O NST: CPT

## 2023-03-27 PROCEDURE — 76816 OB US FOLLOW-UP PER FETUS: CPT

## 2023-03-27 PROCEDURE — 76816 OB US FOLLOW-UP PER FETUS: CPT | Performed by: OBSTETRICS & GYNECOLOGY

## 2023-03-27 NOTE — PROGRESS NOTES
Documentation of the ultrasound findings, images, and interpretations will be available in the patient's Viewpoint report located in the Chart Review Imaging tab in [employer].

## 2023-03-30 ENCOUNTER — ROUTINE PRENATAL (OUTPATIENT)
Dept: OBSTETRICS AND GYNECOLOGY | Facility: CLINIC | Age: 23
End: 2023-03-30
Payer: COMMERCIAL

## 2023-03-30 VITALS — SYSTOLIC BLOOD PRESSURE: 102 MMHG | WEIGHT: 187.2 LBS | BODY MASS INDEX: 33.16 KG/M2 | DIASTOLIC BLOOD PRESSURE: 70 MMHG

## 2023-03-30 DIAGNOSIS — O34.30 CERVICAL CERCLAGE SUTURE PRESENT, ANTEPARTUM: ICD-10-CM

## 2023-03-30 DIAGNOSIS — Z34.02 ENCOUNTER FOR SUPERVISION OF NORMAL FIRST PREGNANCY IN SECOND TRIMESTER: Primary | ICD-10-CM

## 2023-03-30 DIAGNOSIS — O26.872 CERVICAL SHORTENING AFFECTING PREGNANCY IN SECOND TRIMESTER: ICD-10-CM

## 2023-03-30 LAB
GLUCOSE UR STRIP-MCNC: NEGATIVE MG/DL
PROT UR STRIP-MCNC: NEGATIVE MG/DL

## 2023-03-30 NOTE — PROGRESS NOTES
OB FOLLOW UP  CC- Here for care of pregnancy        Nai Ramirez is a 22 y.o.  32w2d patient being seen today for her obstetrical follow up visit. Patient reports low back pain. She denies dysuria, she also reports heartburn. She is taking OTC medication for treatment currently. Patient states she will occassionally have rodrigo hudson contractions.     Her prenatal care is complicated by (and status) :    Patient Active Problem List   Diagnosis   • Pregnancy   • Tobacco use during pregnancy, antepartum   • Cervical funneling   • Cervical shortening affecting pregnancy in second trimester   • Cervical cerclage suture present, antepartum       Flu Status: Declines  TDAP status: declines  Rhogam status: was not indicated  28 week labs: Reviewed and Labs show anemia. She is taking additional iron supplement.  Ultrasound Today: No  Non Stress Test: No.    ROS -   Patient Reports : lower back pain, heartburn, rodrigo hudson.   Patient Denies: Loss of Fluid, Vaginal Spotting, Vision Changes, Headaches, Nausea  and Vomiting   Fetal Movement : normal  All other systems reviewed and are negative.       The additional following portions of the patient's history were reviewed and updated as appropriate: allergies, current medications, past family history, past medical history, past social history, past surgical history and problem list.    I have reviewed and agree with the HPI, ROS, and historical information as entered above. Teresa Richardson, APRN    /70   Wt 84.9 kg (187 lb 3.2 oz)   LMP 2022   BMI 33.16 kg/m²       EXAM:     Prenatal Vitals  BP: 102/70  Weight: 84.9 kg (187 lb 3.2 oz)   Fetal Heart Rate: 135      Fundal Height (cm): 32 cm         Assessment and Plan    Problem List Items Addressed This Visit        Gravid and     Cervical shortening affecting pregnancy in second trimester    Overview     S/p cerclage 22 wks         Relevant Medications    Progesterone  (Prometrium) 100 MG capsule    Cervical cerclage suture present, antepartum    Current Assessment & Plan     PDC to remove 4/26/23         Relevant Medications    Progesterone (Prometrium) 100 MG capsule   Other Visit Diagnoses     Encounter for supervision of normal first pregnancy in second trimester    -  Primary    Relevant Orders    POC Urinalysis Dipstick          1. Pregnancy at 32w2d  2. Fetal status reassuring.  3. 28 week labs reviewed.    4. Activity and Exercise discussed.  5. Fetal movement/PTL or Labor precautions  6. U/S ordered at follow up  7. Patient is on Prenatal vitamins  8. Reviewed Pre-eclampsia signs/symptoms  9. Discussed bASA for PIH prevention from 12 to 36wk  Return in about 4 weeks (around 4/27/2023).    Teresa Richardson, APRN  03/30/2023

## 2023-04-17 ENCOUNTER — TELEPHONE (OUTPATIENT)
Dept: OBSTETRICS AND GYNECOLOGY | Facility: HOSPITAL | Age: 23
End: 2023-04-17
Payer: COMMERCIAL

## 2023-04-17 NOTE — TELEPHONE ENCOUNTER
S/P CERCLAGE 34 6/7 WK, INCREASED ABD AND PELVIC PAIN INSIDE, IF NOT SITTING OR LAYING DOWN, PT IS ON BEDREST

## 2023-04-17 NOTE — TELEPHONE ENCOUNTER
Phoned pt about her complaints,  Pt informed if she starts having bleeding, leaking of fluid or decreased fetal movement she needs to come into labor and delivery to be evaluated.   Pt reports pain inside vagina.  Pt denies fever or discharge.  If she has any increasing pain to come to l &d pt vu

## 2023-04-22 ENCOUNTER — HOSPITAL ENCOUNTER (OUTPATIENT)
Facility: HOSPITAL | Age: 23
Discharge: HOME | End: 2023-04-22
Attending: OBSTETRICS & GYNECOLOGY | Admitting: OBSTETRICS & GYNECOLOGY
Payer: COMMERCIAL

## 2023-04-22 VITALS
SYSTOLIC BLOOD PRESSURE: 107 MMHG | RESPIRATION RATE: 18 BRPM | DIASTOLIC BLOOD PRESSURE: 63 MMHG | HEIGHT: 63 IN | WEIGHT: 188 LBS | TEMPERATURE: 98 F | BODY MASS INDEX: 33.31 KG/M2 | HEART RATE: 89 BPM

## 2023-04-22 PROCEDURE — G0463 HOSPITAL OUTPT CLINIC VISIT: HCPCS

## 2023-04-22 RX ORDER — DOCUSATE SODIUM 250 MG
250 CAPSULE ORAL DAILY
COMMUNITY

## 2023-04-22 RX ORDER — FERROUS SULFATE 325(65) MG
65 TABLET ORAL ONCE
COMMUNITY

## 2023-04-22 NOTE — H&P
[facility]  Obstetric History and Physical    Chief Complaint   Patient presents with   • Decreased Fetal Movement       Subjective     Patient is a 22 y.o. female  currently at 35w4d, who presents with complaints of decreased fetal movement noted this morning.  She denies vaginal bleeding or leakage of fluid.  She denies recent trauma.  She is status post Oakes cervical cerclage earlier in the pregnancy.  She is complaining of some vaginal pain today.    Her prenatal care is as noted above. Her previous obstetric/gynecological history is noncontributory.    The following portions of the patients history were reviewed and updated as appropriate: current medications, allergies, past medical history, past surgical history, past family history, past social history and problem list .        Prenatal Information:   Maternal Prenatal Labs  Blood Type No results found for: ABO   Rh Status No results found for: RH   Antibody Screen No results found for: ABSCRN   Gonnorhea No results found for: GCCX   Chlamydia No results found for: CLAMYDCU   RPR No results found for: RPR   Syphilis Antibody No results found for: SYPHILIS   Rubella No results found for: RUBELLAIGGIN   Hepatitis B Surface Antigen No results found for: HEPBSAG   HIV-1 Antibody No results found for: LABHIV1   Hepatitis C Antibody No results found for: HEPCAB   Rapid Urin Drug Screen No results found for: AMPMETHU, BARBITSCNUR, LABBENZSCN, LABMETHSCN, LABOPIASCN, THCURSCR, COCAINEUR, AMPHETSCREEN, PROPOXSCN, BUPRENORSCNU, METAMPSCNUR, OXYCODONESCN, TRICYCLICSCN   Group B Strep Culture No results found for: GBSANTIGEN           External Prenatal Results     Pregnancy Outside Results - Transcribed From Office Records - See Scanned Records For Details     Test Value Date Time    ABO  O  23    Rh  Positive  23    Antibody Screen  Negative  23 1156       Negative  23 1304       Negative  10/12/22 1020    Varicella IgG        Rubella  1.80 index 10/12/22 1020    Hgb  11.3 g/dL 23 1156       12.5 g/dL 23 1304       14.0 g/dL 10/12/22 1020    Hct  31.9 % 23 1156       35.6 % 23 1304       41.1 % 10/12/22 1020    Glucose Fasting GTT       Glucose Tolerance Test 1 hour       Glucose Tolerance Test 3 hour       Gonorrhea (discrete)       Chlamydia (discrete)       RPR  Non Reactive  10/12/22 1020    VDRL       Syphilis Antibody       HBsAg  Negative  10/12/22 1020    Herpes Simplex Virus PCR       Herpes Simplex VIrus Culture       HIV  Non Reactive  10/12/22 1020    Hep C RNA Quant PCR       Hep C Antibody  <0.1 s/co ratio 10/12/22 1020    AFP  24.4 ng/mL 22 1056    Group B Strep       GBS Susceptibility to Clindamycin       GBS Susceptibility to Erythromycin       Fetal Fibronectin       Genetic Testing, Maternal Blood             Drug Screening     Test Value Date Time    Urine Drug Screen       Amphetamine Screen  Negative ng/mL 22 1030       Negative ng/mL 10/12/22 1030    Barbiturate Screen  Negative ng/mL 22 1030       Negative ng/mL 10/12/22 1030    Benzodiazepine Screen  Negative ng/mL 22 1030       Negative ng/mL 10/12/22 1030    Methadone Screen  Negative ng/mL 22 1030       Negative ng/mL 10/12/22 1030    Phencyclidine Screen  Negative ng/mL 22 1030       Negative ng/mL 10/12/22 1030    Opiates Screen       THC Screen       Cocaine Screen       Propoxyphene Screen  Negative ng/mL 22 1030       Negative ng/mL 10/12/22 1030    Buprenorphine Screen       Methamphetamine Screen       Oxycodone Screen       Tricyclic Antidepressants Screen             Legend    ^: Historical                          Past OB History:       OB History    Para Term  AB Living   1 0 0 0 0 0   SAB IAB Ectopic Molar Multiple Live Births   0 0 0 0 0 0      # Outcome Date GA Lbr Vlad/2nd Weight Sex Delivery Anes PTL Lv   1 Current               Obstetric Comments   FOB #1 :  Pregnancy #1 (NIPS = negative, female)        Past Medical History:  Past Medical History:   Diagnosis Date   • Anxiety 2022    self diagnosed; never treated   • History of asthma     in childhood   • Urinary tract infection    • Yeast infection       Past Surgical History Past Surgical History:   Procedure Laterality Date   • CERVICAL CERCLAGE N/A 1/19/2023    Procedure: CERVICAL CERCLAGE;  Surgeon: Maggi Pascal MD;  Location: [facility] LABOR DELIVERY;  Service: Gynecology;  Laterality: N/A;      Family History: Family History   Problem Relation Age of Onset   • Breast cancer Neg Hx    • Ovarian cancer Neg Hx    • Uterine cancer Neg Hx    • Colon cancer Neg Hx       Social History:  reports that she quit smoking about 7 months ago. Her smoking use included cigarettes. She uses smokeless tobacco.   reports that she does not currently use alcohol.   reports that she does not currently use drugs after having used the following drugs: Marijuana.   Allergies: Omnicef [cefdinir]  Current Medications:          No current facility-administered medications on file prior to encounter.     Current Outpatient Medications on File Prior to Encounter   Medication Sig Dispense Refill   • acetaminophen (TYLENOL) 500 MG tablet Take 1 tablet by mouth Every 6 (Six) Hours As Needed for Mild Pain.     • docusate sodium (COLACE) 250 MG capsule Take 1 capsule by mouth Daily.     • doxylamine (UNISOM) 25 MG tablet Take 1 tablet by mouth At Night As Needed for Sleep.     • ferrous sulfate 325 (65 FE) MG tablet Take 65 mg by mouth 1 (One) Time.     • Prenatal Vit-Fe Fumarate-FA (PRENATAL VITAMIN PO) Take  by mouth.     • Progesterone (Prometrium) 100 MG capsule Insert 1 capsule into the vagina Daily. 30 capsule 6       General ROS: Pertinent items are noted in HPI, all other systems reviewed and negative    Objective       Vital Signs Range for the last 24 hours  Temperature: Temp:  [98 °F (36.7 °C)] 98 °F (36.7 °C)   Temp Source: Temp  src: Oral   BP: BP: (107)/(63) 107/63   Pulse: Heart Rate:  [89] 89   Respirations: Resp:  [18] 18   SPO2:     O2 Amount (l/min):     O2 Devices     Weight: Weight:  [85.3 kg (188 lb)] 85.3 kg (188 lb)     Physical Examination: General appearance - alert, well appearing, and in no distress, oriented to person, place, and time and overweight  Mental status - alert, oriented to person, place, and time, normal mood, behavior, speech, dress, motor activity, and thought processes  Eyes - pupils equal and reactive, extraocular eye movements intact, sclera anicteric  Neck - supple, no significant adenopathy, thyroid exam: thyroid is normal in size without nodules or tenderness  Chest - clear to auscultation, no wheezes, rales or rhonchi, symmetric air entry  Heart - normal rate, regular rhythm, normal S1, S2, no murmurs, rubs, clicks or gallops  Abdomen-soft, nontender, nondistended, no masses or organomegaly   Abdomen, Non-Tender  Pelvic - VULVA: normal appearing vulva with no masses, tenderness or lesions, CERVIX: Cerclage is in place.  Cervix remains closed.  Neurological - alert, oriented, normal speech, no focal findings or movement disorder noted, DTR's normal and symmetric  Extremities - no pedal edema noted    Fetal Heart Rate Assessment  Indication: Decreased fetal movement   Start Time: 1047             end Time: 1125   NST Results: NST reactive.  Fetal heart rate baseline 130 bpm.  Average variability with 15 x 15 accelerations noted.  No decelerations.  No regular uterine contraction pattern.        Laboratory Results:   No results found for: ALKPHOS, ALT, AST, CREATININE, BILITOT, LDH, URICACID    No results found for: WBC, RBC, HGB, HCT, MCV, MCH, MCHC, RDW, RDWSD, MPV, PLT, NEUTRORELPCT, LYMPHORELPCT, MONORELPCT, EOSRELPCT, BASORELPCT, AUTOIGPER, NEUTROABS, LYMPHSABS, MONOSABS, EOSABS, BASOSABS, AUTOIGNUM, NRBC          Brief Urine Lab Results  (Last result in the past 365 days)      Color   Clarity    "Blood   Leuk Est   Nitrite   Protein   CREAT   Urine HCG        03/30/23 1758           Negative                   Radiology Review: No new studies  Other Studies: None    Assessment & Plan       * No active hospital problems. *        Assessment:  1. Complaints of decreased fetal movement.  Fetal wellbeing confirmed with reactive nonstress test.  2. Incompetent cervix status post Oakes cervical cerclage placement.    Plan:  1. Discharge home.  2. Reviewed signs/symptoms of labor and discussed instructions for fetal kick counts.  3. Patient scheduled to follow-up this next week for cervical cerclage removal.     Total time spent today with Nai  was 20-29 minutes (level 3).  Of this time, > 50% was spent face-to-face time coordinating care, answering her questions and counseling regarding pathophysiology of her presenting problem along with plans for any diagnostic work-up and treatment.        Shashank Carter \"Bobbi\" DELMI Jones MD  4/22/2023  11:27 EDT    "

## 2023-04-26 ENCOUNTER — ROUTINE PRENATAL (OUTPATIENT)
Dept: OBSTETRICS AND GYNECOLOGY | Facility: CLINIC | Age: 23
End: 2023-04-26
Payer: COMMERCIAL

## 2023-04-26 ENCOUNTER — HOSPITAL ENCOUNTER (OUTPATIENT)
Dept: WOMENS IMAGING | Facility: HOSPITAL | Age: 23
Discharge: HOME | End: 2023-04-26
Admitting: OBSTETRICS & GYNECOLOGY
Payer: COMMERCIAL

## 2023-04-26 ENCOUNTER — OFFICE VISIT (OUTPATIENT)
Dept: OBSTETRICS AND GYNECOLOGY | Facility: HOSPITAL | Age: 23
End: 2023-04-26
Payer: COMMERCIAL

## 2023-04-26 VITALS — DIASTOLIC BLOOD PRESSURE: 73 MMHG | SYSTOLIC BLOOD PRESSURE: 126 MMHG | BODY MASS INDEX: 34.37 KG/M2 | WEIGHT: 194 LBS

## 2023-04-26 VITALS — DIASTOLIC BLOOD PRESSURE: 70 MMHG | WEIGHT: 193 LBS | BODY MASS INDEX: 34.19 KG/M2 | SYSTOLIC BLOOD PRESSURE: 112 MMHG

## 2023-04-26 DIAGNOSIS — Z3A.36 36 WEEKS GESTATION OF PREGNANCY: ICD-10-CM

## 2023-04-26 DIAGNOSIS — N88.8 CERVICAL FUNNELING: ICD-10-CM

## 2023-04-26 DIAGNOSIS — Z34.90 PREGNANCY, UNSPECIFIED GESTATIONAL AGE: ICD-10-CM

## 2023-04-26 DIAGNOSIS — O26.872 CERVICAL SHORTENING AFFECTING PREGNANCY IN SECOND TRIMESTER: Primary | ICD-10-CM

## 2023-04-26 DIAGNOSIS — O34.30 CERVICAL CERCLAGE SUTURE PRESENT, ANTEPARTUM: ICD-10-CM

## 2023-04-26 DIAGNOSIS — Z34.93 PRENATAL CARE IN THIRD TRIMESTER: Primary | ICD-10-CM

## 2023-04-26 DIAGNOSIS — O99.330 TOBACCO USE DURING PREGNANCY, ANTEPARTUM: ICD-10-CM

## 2023-04-26 PROCEDURE — 0502F SUBSEQUENT PRENATAL CARE: CPT | Performed by: NURSE PRACTITIONER

## 2023-04-26 PROCEDURE — 87081 CULTURE SCREEN ONLY: CPT | Performed by: NURSE PRACTITIONER

## 2023-04-26 PROCEDURE — 76816 OB US FOLLOW-UP PER FETUS: CPT

## 2023-04-26 NOTE — LETTER
2023     Maggi Collins MD  [address] [facility] [address] [place] [id number]    Patient: Nai Ramriez   YOB: 2000   Date of Visit: 2023       Dear Maggi Collins MD,    Thank you for referring Nai Ramirez to me for evaluation. Below is a copy of my consult note.    If you have questions, please do not hesitate to call me. I look forward to following Nai along with you.         Sincerely,        Maggi Pascal MD        CC: No Recipients    PT to follow-up with Primary OB today after PDC. PT verbalized no new concerns at this time.         Maternal/Fetal Medicine Consult Note     Name: Nai Ramirez    : 2000     MRN: [id number]     Referring Provider: Maggi Collins MD    Chief Complaint  No chief complaint on file.    Subjective     History of Present Illness:  Nai Ramirez is a 22 y.o.  36w1d who presents today for ultrasound for fetal growth and cerclage removal.     She denies LOF/VB/ctx's.     VLAD: Estimated Date of Delivery: 23     ROS:   As noted in HPI.     Past Medical History:   Diagnosis Date   • Anxiety     self diagnosed; never treated   • History of asthma     in childhood   • Urinary tract infection    • Yeast infection       Past Surgical History:   Procedure Laterality Date   • CERVICAL CERCLAGE N/A 2023    Procedure: CERVICAL CERCLAGE;  Surgeon: Maggi Pascal MD;  Location: [facility] LABOR DELIVERY;  Service: Gynecology;  Laterality: N/A;      OB History          1    Para   0    Term   0       0    AB   0    Living   0         SAB   0    IAB   0    Ectopic   0    Molar   0    Multiple   0    Live Births   0          Obstetric Comments   FOB #1 : Pregnancy #1 (NIPS = negative, female)                Objective     Vital Signs  /73   Wt 88 kg (194 lb)   LMP 2022   Estimated body mass index is 34.37 kg/m² as calculated from the following:    Height as of 23: 160  "cm (63\").    Weight as of this encounter: 88 kg (194 lb).    Physical Exam  Exam conducted with a chaperone present.   Constitutional:       Appearance: Normal appearance. She is normal weight.   HENT:      Head: Normocephalic and atraumatic.   Pulmonary:      Effort: Pulmonary effort is normal.   Genitourinary:     General: Normal vulva.      Exam position: Lithotomy position.      Vagina: Vaginal discharge present.      Cervix: Normal.            Comments: Cerclage strings and knot noted at MN-1am. Grasped with clamp and elevated until two separate sutures noted. One side cut without difficulty. Cerclage removed intact.   Musculoskeletal:         General: Normal range of motion.   Neurological:      General: No focal deficit present.      Mental Status: She is alert and oriented to person, place, and time.   Psychiatric:         Mood and Affect: Mood normal.         Behavior: Behavior normal.         Thought Content: Thought content normal.         Judgment: Judgment normal.         Ultrasound Impression:   Vtx, normal amniotic fluid, EFW 53rd%, AC 66th%, BPP 8/8    Assessment and Plan     Diagnoses and all orders for this visit:    1. Cervical shortening affecting pregnancy in second trimester (Primary)    2. Cervical cerclage suture present, antepartum  Assessment & Plan:  Cerclage removed without difficulty      3. 36 weeks gestation of pregnancy       Follow Up  Return for No follow-up scheduled..    I spent 20 minutes caring for the patient on the day of service. This included: obtaining or reviewing a separately obtained medical history, reviewing patient records, performing a medically appropriate exam and/or evaluation, counseling or educating the patient/family/caregiver, ordering medications, labs, and/or procedures and documenting such in the medical record. This does not include time spent on review and interpretation of other tests such as fetal ultrasound or the performance of other procedures such " as amniocentesis or CVS.      Maggi Pascal MD  04/26/2023

## 2023-04-26 NOTE — PROGRESS NOTES
OB FOLLOW UP  CC- Here for care of pregnancy        Nai Ramirez is a 22 y.o.  36w1d patient being seen today for her obstetrical follow up visit. Patient reports occasional headaches, in her temple area that occur about once per week.  Tylenol and water does help to alleviate.  Intermittent swelling in her feet/ankles, but does not happen often.  She also reports occasional rodrigo hudson.     She reports cervical cerclage removed at [facility] appt today.    She denies LOF, vaginal spotting or vision changes.  She reports adequate fetal movements, >10 movements in 10 hours.     Her prenatal care is complicated by (and status) : cervical imcompetence   Patient Active Problem List   Diagnosis   • Pregnancy   • Tobacco use during pregnancy, antepartum   • Cervical funneling   • Cervical shortening affecting pregnancy in second trimester   • Cervical cerclage suture present, antepartum       GBS Status: Done Today. She is not allergic to PCN.    Allergies   Allergen Reactions   • Omnicef [Cefdinir] Hives      Her Delivery Plan is: Does not desire IOL    US today: yes, at [facility] prior to appt with us  Non Stress Test: No.      ROS -   Patient Reports : swelling, rodrigo hudson, headaches  Patient Denies: Loss of Fluid, Vaginal Spotting, Vision Changes, Nausea , Vomiting  and Epigastric pain  Fetal Movement : normal  All other systems reviewed and are negative.       The additional following portions of the patient's history were reviewed and updated as appropriate: allergies, current medications, past family history, past medical history, past social history, past surgical history and problem list.    I have reviewed and agree with the HPI, ROS, and historical information as entered above. Mayra Bains, APRN      EXAM:     Prenatal Vitals  BP: 112/70  Weight: 87.5 kg (193 lb)   Fetal Heart Rate: pos                      Assessment and Plan    Problem List Items Addressed This Visit        Gravid and      Cervical cerclage suture present, antepartum    Overview     Removed 23 by PDC         Relevant Medications    Progesterone (Prometrium) 100 MG capsule   Other Visit Diagnoses     Prenatal care in third trimester    -  Primary    Relevant Orders    Group B Streptococcus Culture - Swab, Vaginal/Rectum          1. Pregnancy at 36w1d  2. Fetal status reassuring.   3. Delivery options reviewed with patient  4. Signs of labor reviewed  5. Kick counts reviewed  6. Activity and Exercise discussed.  Return in about 1 week (around 5/3/2023) for SUZIE CRANE.   PDC U/S and cerclage removal today. Reviewed normal bleeding with cerclage removal and when to seek care.   PDC U/S reviewed and normal    Mayra Bains, APRN  2023

## 2023-04-26 NOTE — PROGRESS NOTES
"    Maternal/Fetal Medicine Consult Note     Name: Nai Ramirez    : 2000     MRN: [id number]     Referring Provider: Maggi Collins MD    Chief Complaint  No chief complaint on file.    Subjective     History of Present Illness:  Nai Ramirez is a 22 y.o.  36w1d who presents today for ultrasound for fetal growth and cerclage removal.     She denies LOF/VB/ctx's.     VLAD: Estimated Date of Delivery: 23     ROS:   As noted in HPI.     Past Medical History:   Diagnosis Date   • Anxiety     self diagnosed; never treated   • History of asthma     in childhood   • Urinary tract infection    • Yeast infection       Past Surgical History:   Procedure Laterality Date   • CERVICAL CERCLAGE N/A 2023    Procedure: CERVICAL CERCLAGE;  Surgeon: Maggi Pascal MD;  Location: [facility] LABOR DELIVERY;  Service: Gynecology;  Laterality: N/A;      OB History        1    Para   0    Term   0       0    AB   0    Living   0       SAB   0    IAB   0    Ectopic   0    Molar   0    Multiple   0    Live Births   0          Obstetric Comments   FOB #1 : Pregnancy #1 (NIPS = negative, female)              Objective     Vital Signs  /73   Wt 88 kg (194 lb)   LMP 2022   Estimated body mass index is 34.37 kg/m² as calculated from the following:    Height as of 23: 160 cm (63\").    Weight as of this encounter: 88 kg (194 lb).    Physical Exam  Exam conducted with a chaperone present.   Constitutional:       Appearance: Normal appearance. She is normal weight.   HENT:      Head: Normocephalic and atraumatic.   Pulmonary:      Effort: Pulmonary effort is normal.   Genitourinary:     General: Normal vulva.      Exam position: Lithotomy position.      Vagina: Vaginal discharge present.      Cervix: Normal.            Comments: Cerclage strings and knot noted at MN-1am. Grasped with clamp and elevated until two separate sutures noted. One side cut without " difficulty. Cerclage removed intact.   Musculoskeletal:         General: Normal range of motion.   Neurological:      General: No focal deficit present.      Mental Status: She is alert and oriented to person, place, and time.   Psychiatric:         Mood and Affect: Mood normal.         Behavior: Behavior normal.         Thought Content: Thought content normal.         Judgment: Judgment normal.         Ultrasound Impression:   Vtx, normal amniotic fluid, EFW 53rd%, AC 66th%, BPP 8/8    Assessment and Plan     Diagnoses and all orders for this visit:    1. Cervical shortening affecting pregnancy in second trimester (Primary)    2. Cervical cerclage suture present, antepartum  Assessment & Plan:  Cerclage removed without difficulty      3. 36 weeks gestation of pregnancy       Follow Up  Return for No follow-up scheduled..    I spent 20 minutes caring for the patient on the day of service. This included: obtaining or reviewing a separately obtained medical history, reviewing patient records, performing a medically appropriate exam and/or evaluation, counseling or educating the patient/family/caregiver, ordering medications, labs, and/or procedures and documenting such in the medical record. This does not include time spent on review and interpretation of other tests such as fetal ultrasound or the performance of other procedures such as amniocentesis or CVS.      Maggi Pascal MD  04/26/2023

## 2023-04-28 LAB — BACTERIA SPEC AEROBE CULT: NORMAL

## 2023-05-04 ENCOUNTER — ROUTINE PRENATAL (OUTPATIENT)
Dept: OBSTETRICS AND GYNECOLOGY | Facility: CLINIC | Age: 23
End: 2023-05-04
Payer: COMMERCIAL

## 2023-05-04 VITALS — BODY MASS INDEX: 35 KG/M2 | DIASTOLIC BLOOD PRESSURE: 76 MMHG | WEIGHT: 197.6 LBS | SYSTOLIC BLOOD PRESSURE: 120 MMHG

## 2023-05-04 DIAGNOSIS — Z34.03 ENCOUNTER FOR SUPERVISION OF NORMAL FIRST PREGNANCY IN THIRD TRIMESTER: Primary | ICD-10-CM

## 2023-05-04 LAB
GLUCOSE UR STRIP-MCNC: NEGATIVE MG/DL
PROT UR STRIP-MCNC: NEGATIVE MG/DL

## 2023-05-04 NOTE — PROGRESS NOTES
OB FOLLOW UP  CC- Here for care of pregnancy        Nai Ramirez is a 22 y.o.  37w2d patient being seen today for her obstetrical follow up visit. Patient reports swelling in the upper and lower extremities. It is non- Pitting, vaginal pressure/pain, and heartburn. Patient reports that yesterday she had 6 contractions that were not consistent, she also stated gush of clear fluid yesterday that only occurred once.     Her prenatal care is complicated by (and status) : cervical funneling   Patient Active Problem List   Diagnosis   • Pregnancy   • Tobacco use during pregnancy, antepartum   • Cervical funneling   • Cervical shortening affecting pregnancy in second trimester   • Cervical cerclage suture present, antepartum       GBS Status: negative  Group B Strep Culture   Date Value Ref Range Status   2023 No Group B Streptococcus isolated  Final         Allergies   Allergen Reactions   • Omnicef [Cefdinir] Hives          Flu Status: Declines  Her Delivery Plan is: Undecided  Would like to schedule IOL  US today: no  Non Stress Test: No.       ROS -   Patient Reports : heartburn, vaginal pain/ pressure, swelling in hands and feet, contractions and possible loss of fluid.   Patient Denies: Vaginal Spotting, Vision Changes, Headaches, Nausea  and Vomiting   Fetal Movement : normal  All other systems reviewed and are negative.       The additional following portions of the patient's history were reviewed and updated as appropriate: allergies, current medications, past family history, past medical history, past social history, past surgical history and problem list.    I have reviewed and agree with the HPI, ROS, and historical information as entered above. Mayra Bains, APRN      EXAM:     Prenatal Vitals  BP: 120/76  Weight: 89.6 kg (197 lb 9.6 oz)   Fetal Heart Rate: pos   Fundal Height (cm): 37 cm   Dilation/Effacement/Station  Dilation: Fingertip  Effacement (%): 70  Station:  -2      Urine Glucose Read-only: Negative  Urine Protein Read-only: Negative       Assessment and Plan    Problem List Items Addressed This Visit    None  Visit Diagnoses     Encounter for supervision of normal first pregnancy in third trimester    -  Primary    Relevant Orders    POC Urinalysis Dipstick (Completed)          1. Pregnancy at 37w2d  2. Fetal status reassuring.   3. Reviewed Pre-eclampsia signs/symptoms  4. Delivery options reviewed with patient  5. Signs of labor reviewed  6. Kick counts reviewed  7. Activity and Exercise discussed.  Return in about 1 week (around 5/11/2023) for [place] SUZIE.   Elective IOL scheduled for 39 weeks    Mayra Bains, APRN  05/04/2023

## 2023-05-11 ENCOUNTER — ROUTINE PRENATAL (OUTPATIENT)
Dept: OBSTETRICS AND GYNECOLOGY | Facility: CLINIC | Age: 23
End: 2023-05-11
Payer: COMMERCIAL

## 2023-05-11 VITALS — WEIGHT: 198.2 LBS | DIASTOLIC BLOOD PRESSURE: 80 MMHG | SYSTOLIC BLOOD PRESSURE: 122 MMHG | BODY MASS INDEX: 35.11 KG/M2

## 2023-05-11 DIAGNOSIS — N88.8 CERVICAL FUNNELING: ICD-10-CM

## 2023-05-11 DIAGNOSIS — Z34.93 PRENATAL CARE IN THIRD TRIMESTER: Primary | ICD-10-CM

## 2023-05-11 LAB
EXPIRATION DATE: 0
GLUCOSE UR STRIP-MCNC: NEGATIVE MG/DL
Lab: 0
PROT UR STRIP-MCNC: NEGATIVE MG/DL

## 2023-05-11 NOTE — PROGRESS NOTES
OB FOLLOW UP  CC- Here for care of pregnancy        Nai Ramirez is a 22 y.o.  38w2d patient being seen today for her obstetrical follow up visit. Patient reports continued pelvic pressure (moderate, but no use of bellyband at the moment), mild [place] Diop (occasional), occasional upper/lower bilateral trace edema, and acid reflux (Tums helps).     Her prenatal care is complicated by (and status):  Patient Active Problem List   Diagnosis   • Pregnancy   • Tobacco use during pregnancy, antepartum   • Cervical funneling   • Cervical shortening affecting pregnancy in second trimester   • Cervical cerclage suture present, antepartum       GBS Status:   Group B Strep Culture   Date Value Ref Range Status   2023 No Group B Streptococcus isolated  Final         Allergies   Allergen Reactions   • Omnicef [Cefdinir] Hives          Flu Status: declined in current flu season  Her Delivery Plan is: [facility] IOL at 39wks. Scheduled for    today: no  Non Stress Test: No.      ROS -   Patient Reports : see above  Patient Denies: Loss of Fluid, Vaginal Spotting, Vision Changes, Headaches, Nausea  and Vomiting   Fetal Movement : normal  All other systems reviewed and are negative.       The additional following portions of the patient's history were reviewed and updated as appropriate: allergies, current medications, past family history, past medical history, past social history, past surgical history and problem list.    I have reviewed and agree with the HPI, ROS, and historical information as entered above. Teresa Richardson, APRN      EXAM:     Prenatal Vitals  BP: 122/80  Weight: 89.9 kg (198 lb 3.2 oz)   Fetal Heart Rate: 138            Assessment and Plan    Problem List Items Addressed This Visit        Genitourinary and Reproductive     Cervical funneling    Overview     21 wks. CL 2.9cm, but small funnel seen 20 and 21 wks. Vaginal progesterone 100mg, refer PDC        Other Visit Diagnoses      Prenatal care in third trimester    -  Primary    Relevant Orders    POC Glucose, Urine, Qualitative, Dipstick (Completed)    POC Protein, Urine, Qualitative, Dipstick (Completed)          1. Pregnancy at 38w2d  2. Fetal status reassuring.   3. Reviewed Pre-eclampsia signs/symptoms  4. Discussed IOL options with patient. Pt. desires IOL at 39 weeks.   5. Reviewed upcoming IOL with patient. Instructions given.  6. Delivery options reviewed with patient  7. Signs of labor reviewed  8. Kick counts reviewed  9. Activity and Exercise discussed.  10. Induction 5/16/23.    Teresa Richardson, APRN  05/11/2023   Worry: Not on file     Inability: Not on file    Transportation needs:     Medical: Not on file     Non-medical: Not on file   Tobacco Use    Smoking status: Former Smoker     Last attempt to quit: 3/13/2016     Years since quitting: 3.3    Smokeless tobacco: Never Used   Substance and Sexual Activity    Alcohol use: Yes     Comment: ocassionally    Drug use: Yes     Types: Marijuana     Comment: rarely, Sometimes once a month    Sexual activity: Yes   Lifestyle    Physical activity:     Days per week: Not on file     Minutes per session: Not on file    Stress: Not on file   Relationships    Social connections:     Talks on phone: Not on file     Gets together: Not on file     Attends Judaism service: Not on file     Active member of club or organization: Not on file     Attends meetings of clubs or organizations: Not on file     Relationship status: Not on file    Intimate partner violence:     Fear of current or ex partner: Not on file     Emotionally abused: Not on file     Physically abused: Not on file     Forced sexual activity: Not on file   Other Topics Concern    Not on file   Social History Narrative    Not on file       Family History:       Problem Relation Age of Onset    High Blood Pressure Father     Asthma Brother     Breast Cancer Maternal Aunt        Review of Systems   Constitutional: Negative. Negative for chills and fever. HENT: Positive for congestion (sinus). Negative for ear pain, hearing loss, sore throat and trouble swallowing. Eyes: Negative. Negative for visual disturbance. Respiratory: Negative for cough, chest tightness, shortness of breath, wheezing and stridor. Cardiovascular: Negative for chest pain and palpitations. Gastrointestinal: Negative for abdominal pain, constipation, diarrhea, nausea and vomiting. Endocrine: Negative. Genitourinary: Negative for dysuria and frequency.         C/o burning with urination with low back pain   Musculoskeletal:

## 2023-05-16 ENCOUNTER — HOSPITAL ENCOUNTER (INPATIENT)
Facility: HOSPITAL | Age: 23
LOS: 3 days | Discharge: HOME | End: 2023-05-19
Attending: OBSTETRICS & GYNECOLOGY | Admitting: OBSTETRICS & GYNECOLOGY
Payer: COMMERCIAL

## 2023-05-16 ENCOUNTER — HOSPITAL ENCOUNTER (OUTPATIENT)
Dept: LABOR AND DELIVERY | Facility: HOSPITAL | Age: 23
Discharge: HOME | End: 2023-05-16
Payer: COMMERCIAL

## 2023-05-16 PROBLEM — Z34.90 CURRENTLY PREGNANT: Status: ACTIVE | Noted: 2023-05-16

## 2023-05-16 LAB
ABO GROUP BLD: NORMAL
BLD GP AB SCN SERPL QL: NEGATIVE
DEPRECATED RDW RBC AUTO: 42.5 FL (ref 37–54)
ERYTHROCYTE [DISTWIDTH] IN BLOOD BY AUTOMATED COUNT: 11.9 % (ref 12.3–15.4)
HCT VFR BLD AUTO: 39.8 % (ref 34–46.6)
HGB BLD-MCNC: 13.9 G/DL (ref 12–15.9)
MCH RBC QN AUTO: 34.1 PG (ref 26.6–33)
MCHC RBC AUTO-ENTMCNC: 34.9 G/DL (ref 31.5–35.7)
MCV RBC AUTO: 97.5 FL (ref 79–97)
PLATELET # BLD AUTO: 191 10*3/MM3 (ref 140–450)
PMV BLD AUTO: 11.8 FL (ref 6–12)
RBC # BLD AUTO: 4.08 10*6/MM3 (ref 3.77–5.28)
RH BLD: POSITIVE
T&S EXPIRATION DATE: NORMAL
WBC NRBC COR # BLD: 12.75 10*3/MM3 (ref 3.4–10.8)

## 2023-05-16 PROCEDURE — 86850 RBC ANTIBODY SCREEN: CPT | Performed by: OBSTETRICS & GYNECOLOGY

## 2023-05-16 PROCEDURE — 85027 COMPLETE CBC AUTOMATED: CPT | Performed by: OBSTETRICS & GYNECOLOGY

## 2023-05-16 PROCEDURE — 3E033VJ INTRODUCTION OF OTHER HORMONE INTO PERIPHERAL VEIN, PERCUTANEOUS APPROACH: ICD-10-PCS | Performed by: OBSTETRICS & GYNECOLOGY

## 2023-05-16 PROCEDURE — 36415 COLL VENOUS BLD VENIPUNCTURE: CPT | Performed by: OBSTETRICS & GYNECOLOGY

## 2023-05-16 PROCEDURE — 86901 BLOOD TYPING SEROLOGIC RH(D): CPT | Performed by: OBSTETRICS & GYNECOLOGY

## 2023-05-16 PROCEDURE — 86900 BLOOD TYPING SEROLOGIC ABO: CPT | Performed by: OBSTETRICS & GYNECOLOGY

## 2023-05-16 PROCEDURE — 59025 FETAL NON-STRESS TEST: CPT

## 2023-05-16 RX ORDER — LIDOCAINE HYDROCHLORIDE 10 MG/ML
5 INJECTION, SOLUTION EPIDURAL; INFILTRATION; INTRACAUDAL; PERINEURAL AS NEEDED
Status: DISCONTINUED | OUTPATIENT
Start: 2023-05-16 | End: 2023-05-17 | Stop reason: HOSPADM

## 2023-05-16 RX ORDER — OXYTOCIN/0.9 % SODIUM CHLORIDE 30/500 ML
2-30 PLASTIC BAG, INJECTION (ML) INTRAVENOUS
Status: DISCONTINUED | OUTPATIENT
Start: 2023-05-16 | End: 2023-05-17 | Stop reason: HOSPADM

## 2023-05-16 RX ORDER — SODIUM CHLORIDE 0.9 % (FLUSH) 0.9 %
10 SYRINGE (ML) INJECTION AS NEEDED
Status: DISCONTINUED | OUTPATIENT
Start: 2023-05-16 | End: 2023-05-17 | Stop reason: HOSPADM

## 2023-05-16 RX ORDER — SODIUM CHLORIDE 0.9 % (FLUSH) 0.9 %
3 SYRINGE (ML) INJECTION EVERY 12 HOURS SCHEDULED
Status: DISCONTINUED | OUTPATIENT
Start: 2023-05-16 | End: 2023-05-17 | Stop reason: HOSPADM

## 2023-05-16 RX ORDER — SODIUM CHLORIDE, SODIUM LACTATE, POTASSIUM CHLORIDE, CALCIUM CHLORIDE 600; 310; 30; 20 MG/100ML; MG/100ML; MG/100ML; MG/100ML
125 INJECTION, SOLUTION INTRAVENOUS CONTINUOUS
Status: DISCONTINUED | OUTPATIENT
Start: 2023-05-16 | End: 2023-05-17

## 2023-05-16 RX ORDER — MAGNESIUM CARB/ALUMINUM HYDROX 105-160MG
30 TABLET,CHEWABLE ORAL ONCE
Status: DISCONTINUED | OUTPATIENT
Start: 2023-05-16 | End: 2023-05-17 | Stop reason: HOSPADM

## 2023-05-16 RX ADMIN — Medication 2 MILLI-UNITS/MIN: at 21:25

## 2023-05-17 ENCOUNTER — ANESTHESIA EVENT (OUTPATIENT)
Dept: LABOR AND DELIVERY | Facility: HOSPITAL | Age: 23
End: 2023-05-17
Payer: COMMERCIAL

## 2023-05-17 ENCOUNTER — ANESTHESIA (OUTPATIENT)
Dept: LABOR AND DELIVERY | Facility: HOSPITAL | Age: 23
End: 2023-05-17
Payer: COMMERCIAL

## 2023-05-17 PROCEDURE — 59400 OBSTETRICAL CARE: CPT | Performed by: OBSTETRICS & GYNECOLOGY

## 2023-05-17 PROCEDURE — 25010000002 FENTANYL CITRATE (PF) 50 MCG/ML SOLUTION: Performed by: ANESTHESIOLOGY

## 2023-05-17 PROCEDURE — 0UQG7ZZ REPAIR VAGINA, VIA NATURAL OR ARTIFICIAL OPENING: ICD-10-PCS | Performed by: OBSTETRICS & GYNECOLOGY

## 2023-05-17 PROCEDURE — C1755 CATHETER, INTRASPINAL: HCPCS

## 2023-05-17 PROCEDURE — 51703 INSERT BLADDER CATH COMPLEX: CPT

## 2023-05-17 PROCEDURE — 10907ZC DRAINAGE OF AMNIOTIC FLUID, THERAPEUTIC FROM PRODUCTS OF CONCEPTION, VIA NATURAL OR ARTIFICIAL OPENING: ICD-10-PCS | Performed by: OBSTETRICS & GYNECOLOGY

## 2023-05-17 PROCEDURE — 59025 FETAL NON-STRESS TEST: CPT

## 2023-05-17 PROCEDURE — 0503F POSTPARTUM CARE VISIT: CPT | Performed by: NURSE PRACTITIONER

## 2023-05-17 PROCEDURE — 25010000002 ROPIVACAINE PER 1 MG: Performed by: ANESTHESIOLOGY

## 2023-05-17 PROCEDURE — 0UQMXZZ REPAIR VULVA, EXTERNAL APPROACH: ICD-10-PCS | Performed by: OBSTETRICS & GYNECOLOGY

## 2023-05-17 PROCEDURE — 25010000002 ONDANSETRON PER 1 MG: Performed by: ANESTHESIOLOGY

## 2023-05-17 PROCEDURE — C1755 CATHETER, INTRASPINAL: HCPCS | Performed by: ANESTHESIOLOGY

## 2023-05-17 RX ORDER — DIPHENHYDRAMINE HYDROCHLORIDE 50 MG/ML
12.5 INJECTION INTRAMUSCULAR; INTRAVENOUS EVERY 8 HOURS PRN
Status: DISCONTINUED | OUTPATIENT
Start: 2023-05-17 | End: 2023-05-17 | Stop reason: HOSPADM

## 2023-05-17 RX ORDER — TRISODIUM CITRATE DIHYDRATE AND CITRIC ACID MONOHYDRATE 500; 334 MG/5ML; MG/5ML
30 SOLUTION ORAL ONCE
Status: DISCONTINUED | OUTPATIENT
Start: 2023-05-17 | End: 2023-05-17 | Stop reason: HOSPADM

## 2023-05-17 RX ORDER — OXYTOCIN/0.9 % SODIUM CHLORIDE 30/500 ML
250 PLASTIC BAG, INJECTION (ML) INTRAVENOUS CONTINUOUS
Status: ACTIVE | OUTPATIENT
Start: 2023-05-17 | End: 2023-05-17

## 2023-05-17 RX ORDER — PROMETHAZINE HYDROCHLORIDE 12.5 MG/1
12.5 TABLET ORAL EVERY 6 HOURS PRN
Status: DISCONTINUED | OUTPATIENT
Start: 2023-05-17 | End: 2023-05-17 | Stop reason: HOSPADM

## 2023-05-17 RX ORDER — ACETAMINOPHEN 325 MG/1
650 TABLET ORAL EVERY 4 HOURS PRN
Status: DISCONTINUED | OUTPATIENT
Start: 2023-05-17 | End: 2023-05-17 | Stop reason: HOSPADM

## 2023-05-17 RX ORDER — LIDOCAINE HYDROCHLORIDE AND EPINEPHRINE 15; 5 MG/ML; UG/ML
INJECTION, SOLUTION EPIDURAL AS NEEDED
Status: DISCONTINUED | OUTPATIENT
Start: 2023-05-17 | End: 2023-05-17 | Stop reason: SURG

## 2023-05-17 RX ORDER — ACETAMINOPHEN 325 MG/1
650 TABLET ORAL EVERY 6 HOURS PRN
Status: DISCONTINUED | OUTPATIENT
Start: 2023-05-17 | End: 2023-05-19 | Stop reason: HOSPADM

## 2023-05-17 RX ORDER — BISACODYL 10 MG
10 SUPPOSITORY, RECTAL RECTAL DAILY PRN
Status: DISCONTINUED | OUTPATIENT
Start: 2023-05-18 | End: 2023-05-19 | Stop reason: HOSPADM

## 2023-05-17 RX ORDER — ONDANSETRON 2 MG/ML
4 INJECTION INTRAMUSCULAR; INTRAVENOUS ONCE AS NEEDED
Status: COMPLETED | OUTPATIENT
Start: 2023-05-17 | End: 2023-05-17

## 2023-05-17 RX ORDER — HYDROCODONE BITARTRATE AND ACETAMINOPHEN 5; 325 MG/1; MG/1
1 TABLET ORAL EVERY 4 HOURS PRN
Status: DISCONTINUED | OUTPATIENT
Start: 2023-05-17 | End: 2023-05-19 | Stop reason: HOSPADM

## 2023-05-17 RX ORDER — HYDROCORTISONE 25 MG/G
1 CREAM TOPICAL AS NEEDED
Status: DISCONTINUED | OUTPATIENT
Start: 2023-05-17 | End: 2023-05-19 | Stop reason: HOSPADM

## 2023-05-17 RX ORDER — BUPIVACAINE HYDROCHLORIDE 2.5 MG/ML
INJECTION, SOLUTION EPIDURAL; INFILTRATION; INTRACAUDAL AS NEEDED
Status: DISCONTINUED | OUTPATIENT
Start: 2023-05-17 | End: 2023-05-17 | Stop reason: SURG

## 2023-05-17 RX ORDER — HYDROCODONE BITARTRATE AND ACETAMINOPHEN 10; 325 MG/1; MG/1
1 TABLET ORAL EVERY 4 HOURS PRN
Status: DISCONTINUED | OUTPATIENT
Start: 2023-05-17 | End: 2023-05-19 | Stop reason: HOSPADM

## 2023-05-17 RX ORDER — FAMOTIDINE 10 MG/ML
20 INJECTION, SOLUTION INTRAVENOUS ONCE AS NEEDED
Status: COMPLETED | OUTPATIENT
Start: 2023-05-17 | End: 2023-05-17

## 2023-05-17 RX ORDER — METHYLERGONOVINE MALEATE 0.2 MG/ML
200 INJECTION INTRAVENOUS ONCE AS NEEDED
Status: DISCONTINUED | OUTPATIENT
Start: 2023-05-17 | End: 2023-05-17 | Stop reason: HOSPADM

## 2023-05-17 RX ORDER — CARBOPROST TROMETHAMINE 250 UG/ML
250 INJECTION, SOLUTION INTRAMUSCULAR AS NEEDED
Status: DISCONTINUED | OUTPATIENT
Start: 2023-05-17 | End: 2023-05-17 | Stop reason: HOSPADM

## 2023-05-17 RX ORDER — SODIUM CHLORIDE 0.9 % (FLUSH) 0.9 %
1-10 SYRINGE (ML) INJECTION AS NEEDED
Status: DISCONTINUED | OUTPATIENT
Start: 2023-05-17 | End: 2023-05-19 | Stop reason: HOSPADM

## 2023-05-17 RX ORDER — ONDANSETRON 2 MG/ML
4 INJECTION INTRAMUSCULAR; INTRAVENOUS EVERY 6 HOURS PRN
Status: DISCONTINUED | OUTPATIENT
Start: 2023-05-17 | End: 2023-05-19 | Stop reason: HOSPADM

## 2023-05-17 RX ORDER — ONDANSETRON 4 MG/1
4 TABLET, FILM COATED ORAL EVERY 6 HOURS PRN
Status: DISCONTINUED | OUTPATIENT
Start: 2023-05-17 | End: 2023-05-19 | Stop reason: HOSPADM

## 2023-05-17 RX ORDER — PROMETHAZINE HYDROCHLORIDE 12.5 MG/1
12.5 SUPPOSITORY RECTAL EVERY 6 HOURS PRN
Status: DISCONTINUED | OUTPATIENT
Start: 2023-05-17 | End: 2023-05-17 | Stop reason: HOSPADM

## 2023-05-17 RX ORDER — OXYCODONE AND ACETAMINOPHEN 7.5; 325 MG/1; MG/1
2 TABLET ORAL EVERY 4 HOURS PRN
Status: DISCONTINUED | OUTPATIENT
Start: 2023-05-17 | End: 2023-05-17 | Stop reason: HOSPADM

## 2023-05-17 RX ORDER — MISOPROSTOL 200 UG/1
800 TABLET ORAL AS NEEDED
Status: DISCONTINUED | OUTPATIENT
Start: 2023-05-17 | End: 2023-05-17 | Stop reason: HOSPADM

## 2023-05-17 RX ORDER — MISOPROSTOL 200 UG/1
800 TABLET ORAL AS NEEDED
Status: DISCONTINUED | OUTPATIENT
Start: 2023-05-17 | End: 2023-05-19 | Stop reason: HOSPADM

## 2023-05-17 RX ORDER — OXYTOCIN/0.9 % SODIUM CHLORIDE 30/500 ML
999 PLASTIC BAG, INJECTION (ML) INTRAVENOUS ONCE
Status: DISCONTINUED | OUTPATIENT
Start: 2023-05-17 | End: 2023-05-19 | Stop reason: HOSPADM

## 2023-05-17 RX ORDER — MORPHINE SULFATE 2 MG/ML
2 INJECTION, SOLUTION INTRAMUSCULAR; INTRAVENOUS
Status: DISCONTINUED | OUTPATIENT
Start: 2023-05-17 | End: 2023-05-17 | Stop reason: HOSPADM

## 2023-05-17 RX ORDER — IBUPROFEN 600 MG/1
600 TABLET ORAL EVERY 6 HOURS PRN
Status: DISCONTINUED | OUTPATIENT
Start: 2023-05-17 | End: 2023-05-19 | Stop reason: HOSPADM

## 2023-05-17 RX ORDER — PROMETHAZINE HYDROCHLORIDE 12.5 MG/1
12.5 TABLET ORAL EVERY 4 HOURS PRN
Status: DISCONTINUED | OUTPATIENT
Start: 2023-05-17 | End: 2023-05-19 | Stop reason: HOSPADM

## 2023-05-17 RX ORDER — ROPIVACAINE HYDROCHLORIDE 2 MG/ML
15 INJECTION, SOLUTION EPIDURAL; INFILTRATION; PERINEURAL CONTINUOUS
Status: DISCONTINUED | OUTPATIENT
Start: 2023-05-17 | End: 2023-05-17

## 2023-05-17 RX ORDER — DOCUSATE SODIUM 100 MG/1
100 CAPSULE, LIQUID FILLED ORAL 2 TIMES DAILY
Status: DISCONTINUED | OUTPATIENT
Start: 2023-05-17 | End: 2023-05-19 | Stop reason: HOSPADM

## 2023-05-17 RX ORDER — FENTANYL CITRATE 50 UG/ML
INJECTION, SOLUTION INTRAMUSCULAR; INTRAVENOUS AS NEEDED
Status: DISCONTINUED | OUTPATIENT
Start: 2023-05-17 | End: 2023-05-17 | Stop reason: SURG

## 2023-05-17 RX ORDER — EPHEDRINE SULFATE 5 MG/ML
10 INJECTION INTRAVENOUS
Status: DISCONTINUED | OUTPATIENT
Start: 2023-05-17 | End: 2023-05-17 | Stop reason: HOSPADM

## 2023-05-17 RX ADMIN — BUPIVACAINE HYDROCHLORIDE 10 ML: 2.5 INJECTION, SOLUTION EPIDURAL; INFILTRATION; INTRACAUDAL; PERINEURAL at 01:21

## 2023-05-17 RX ADMIN — ACETAMINOPHEN 650 MG: 325 TABLET ORAL at 09:35

## 2023-05-17 RX ADMIN — FAMOTIDINE 20 MG: 10 INJECTION INTRAVENOUS at 03:53

## 2023-05-17 RX ADMIN — IBUPROFEN 600 MG: 600 TABLET, FILM COATED ORAL at 17:58

## 2023-05-17 RX ADMIN — LIDOCAINE HYDROCHLORIDE AND EPINEPHRINE 3 ML: 15; 5 INJECTION, SOLUTION EPIDURAL at 01:18

## 2023-05-17 RX ADMIN — DOCUSATE SODIUM 100 MG: 100 CAPSULE, LIQUID FILLED ORAL at 21:44

## 2023-05-17 RX ADMIN — ROPIVACAINE HYDROCHLORIDE 15 ML/HR: 2 INJECTION, SOLUTION EPIDURAL; INFILTRATION at 01:25

## 2023-05-17 RX ADMIN — Medication: at 09:35

## 2023-05-17 RX ADMIN — FENTANYL CITRATE 100 MCG: 50 INJECTION, SOLUTION INTRAMUSCULAR; INTRAVENOUS at 01:20

## 2023-05-17 RX ADMIN — LIDOCAINE HYDROCHLORIDE AND EPINEPHRINE 2 ML: 15; 5 INJECTION, SOLUTION EPIDURAL at 01:19

## 2023-05-17 RX ADMIN — BENZOCAINE 1 APPLICATION: 5.6 OINTMENT TOPICAL at 09:35

## 2023-05-17 RX ADMIN — Medication 1 APPLICATION: at 09:35

## 2023-05-17 RX ADMIN — DOCUSATE SODIUM 100 MG: 100 CAPSULE, LIQUID FILLED ORAL at 09:35

## 2023-05-17 RX ADMIN — WITCH HAZEL 1 PAD: 500 SOLUTION RECTAL; TOPICAL at 09:35

## 2023-05-17 RX ADMIN — ONDANSETRON 4 MG: 2 INJECTION INTRAMUSCULAR; INTRAVENOUS at 06:44

## 2023-05-17 NOTE — LACTATION NOTE
05/17/23 1350   Maternal Information   Date of Referral 05/17/23   Person Making Referral lactation consultant   Maternal Reason for Referral breastfeeding currently;no prior breastfeeding experience  (Previous piercing bilaterally, removed several years ago.  Breastfeeding education provided, information given.  Mom has momcozy and haakaa at home, spectra given from [employer] stock.  Assisted with positioning and latching infant in CC hold on right)   Infant Reason for Referral other (see comments)  (breast with small shield.  Swallowing audible and colostrum noted in shield.)   Maternal Assessment   Breast Size Issue none   Breast Shape Bilateral:;round   Breast Density Bilateral:;soft   Nipples Bilateral:;everted;short   Left Nipple Symptoms intact;nontender   Right Nipple Symptoms intact;nontender   Maternal Infant Feeding   Maternal Emotional State receptive;anxious   Infant Positioning cross-cradle   Signs of Milk Transfer audible swallow;deep jaw excursions noted;other (see comments)  (colostrum noted in shield and infant's mouth)   Pain with Feeding no   Comfort Measures Before/During Feeding infant position adjusted;latch adjusted;maternal position adjusted   Nipple Shape After Feeding, Right round;symmetrical;appropriately projected   Latch Assistance full assistance needed;verbal guidance offered   Support Person Involvement actively supporting mother;verbally supports mother   Milk Expression/Equipment   Breast Pump Type double electric, hospital grade   Equipment for Home Use breast pump provided

## 2023-05-17 NOTE — ANESTHESIA PREPROCEDURE EVALUATION
Anesthesia Evaluation     Patient summary reviewed and Nursing notes reviewed                Airway   Mallampati: II  TM distance: >3 FB  Neck ROM: full  No difficulty expected  Dental      Pulmonary - negative pulmonary ROS   Cardiovascular - negative cardio ROS        Neuro/Psych  (+) psychiatric history Anxiety,    GI/Hepatic/Renal/Endo - negative ROS     Musculoskeletal (-) negative ROS    Abdominal    Substance History - negative use     OB/GYN    (+) Pregnant,         Other                        Anesthesia Plan    ASA 2     epidural       Anesthetic plan, risks, benefits, and alternatives have been provided, discussed and informed consent has been obtained with: patient.    Use of blood products discussed with patient .       CODE STATUS:

## 2023-05-17 NOTE — L&D DELIVERY NOTE
2023    Patient:Nai Ramirez    MR#:[id number]    Vaginal Delivery Note  22 y.o. yo female  at 39w1d    Patient Active Problem List   Diagnosis   • Pregnancy   • Tobacco use during pregnancy, antepartum   • Cervical funneling   • Cervical shortening affecting pregnancy in second trimester   • Cervical cerclage suture present, antepartum   • Currently pregnant       Delivery     Delivery: Vaginal, Spontaneous     YOB: 2024    Time of Birth: 6:58 AM      Anesthesia: Epidural     Delivering clinician: Maggi Collins    Forceps?   No   Vacuum? No    Shoulder dystocia present: No     Pt progressed to complete, AROM, and then pushed for a short time and delivered without difficulty. Baby vigorous and crying.      Infant    Findings: female  infant     Infant observations: Weight: 3530 g (7 lb 12.5 oz)     Observations/Comments:        Apgars: 8  @ 1 minute /    9  @ 5 minutes         Placenta, Cord, and Fluid    Placenta delivered  Spontaneous  at  2023  7:01 AM     Cord:   present.   Nuchal Cord?  no   Cord blood obtained:     Cord gases obtained:      Cord gas results: Pending         Repair    Episiotomy: No   Lacerations: Yes  Laceration Information  Laceration Repaired?   Perineal: None      Periurethral:       Labial: right  Yes    Sulcus:       Vaginal: Yes  Yes    Cervical:         Suture used for repair: 2-0 Vicryl   Estimated Blood Loss:   200mls.         Complications  none    Disposition  Mother to Mother Baby/Postpartum  in stable condition currently.  Baby to remains with mom  in stable condition currently.                Maggi Collins MD  23  07:16 EDT

## 2023-05-17 NOTE — ANESTHESIA PROCEDURE NOTES
Labor Epidural      Patient reassessed immediately prior to procedure    Patient location during procedure: OB  Performed By  Anesthesiologist: Emeka Joshi DO  Preanesthetic Checklist  Completed: patient identified, IV checked, site marked, risks and benefits discussed, surgical consent, monitors and equipment checked, pre-op evaluation and timeout performed  Prep:  Pt Position:sitting  Sterile Tech:gloves, mask, sterile barrier and cap  Prep:chlorhexidine gluconate and isopropyl alcohol  Monitoring:blood pressure monitoring and continuous pulse oximetry  Epidural Block Procedure:  Approach:midline  Guidance:landmark technique and palpation technique  Location:L3-L4  Needle Type:Tuohy  Needle Gauge:17 G  Loss of Resistance Medium: air  Loss of Resistance: 6cm  Cath Depth at skin:12 cm  Paresthesia: none  Aspiration:negative  Test Dose:negative  Number of Attempts: 1  Post Assessment:  Dressing:secured with tape and occlusive dressing applied (Tegaderm Placed)  Pt Tolerance:patient tolerated the procedure well with no apparent complications  Complications:no

## 2023-05-18 LAB
BASOPHILS # BLD AUTO: 0.07 10*3/MM3 (ref 0–0.2)
BASOPHILS NFR BLD AUTO: 0.4 % (ref 0–1.5)
DEPRECATED RDW RBC AUTO: 44.1 FL (ref 37–54)
EOSINOPHIL # BLD AUTO: 0.26 10*3/MM3 (ref 0–0.4)
EOSINOPHIL NFR BLD AUTO: 1.6 % (ref 0.3–6.2)
ERYTHROCYTE [DISTWIDTH] IN BLOOD BY AUTOMATED COUNT: 12.1 % (ref 12.3–15.4)
HCT VFR BLD AUTO: 33.7 % (ref 34–46.6)
HGB BLD-MCNC: 11.4 G/DL (ref 12–15.9)
IMM GRANULOCYTES # BLD AUTO: 0.13 10*3/MM3 (ref 0–0.05)
IMM GRANULOCYTES NFR BLD AUTO: 0.8 % (ref 0–0.5)
LYMPHOCYTES # BLD AUTO: 3.25 10*3/MM3 (ref 0.7–3.1)
LYMPHOCYTES NFR BLD AUTO: 20.6 % (ref 19.6–45.3)
MCH RBC QN AUTO: 34 PG (ref 26.6–33)
MCHC RBC AUTO-ENTMCNC: 33.8 G/DL (ref 31.5–35.7)
MCV RBC AUTO: 100.6 FL (ref 79–97)
MONOCYTES # BLD AUTO: 1.11 10*3/MM3 (ref 0.1–0.9)
MONOCYTES NFR BLD AUTO: 7 % (ref 5–12)
NEUTROPHILS NFR BLD AUTO: 10.94 10*3/MM3 (ref 1.7–7)
NEUTROPHILS NFR BLD AUTO: 69.6 % (ref 42.7–76)
NRBC BLD AUTO-RTO: 0 /100 WBC (ref 0–0.2)
PLATELET # BLD AUTO: 151 10*3/MM3 (ref 140–450)
PMV BLD AUTO: 12.1 FL (ref 6–12)
RBC # BLD AUTO: 3.35 10*6/MM3 (ref 3.77–5.28)
WBC NRBC COR # BLD: 15.76 10*3/MM3 (ref 3.4–10.8)

## 2023-05-18 PROCEDURE — 0503F POSTPARTUM CARE VISIT: CPT | Performed by: NURSE PRACTITIONER

## 2023-05-18 PROCEDURE — 85025 COMPLETE CBC W/AUTO DIFF WBC: CPT | Performed by: OBSTETRICS & GYNECOLOGY

## 2023-05-18 RX ADMIN — IBUPROFEN 600 MG: 600 TABLET, FILM COATED ORAL at 09:15

## 2023-05-18 RX ADMIN — DOCUSATE SODIUM 100 MG: 100 CAPSULE, LIQUID FILLED ORAL at 21:24

## 2023-05-18 RX ADMIN — DOCUSATE SODIUM 100 MG: 100 CAPSULE, LIQUID FILLED ORAL at 09:15

## 2023-05-18 NOTE — PROGRESS NOTES
Postpartum Progress Note    Patient name: Nai Ramirez  YOB: 2000   MRN: [id number]  Referring Provider: Maggi Collins MD  Admission Date: 2023  Date of Service: 2023    ID: 22 y.o.     Diagnosis:   S/p vaginal delivery     Currently pregnant       Subjective:      No complaints.  Moderate lochia.  Ambulating, voiding, tolerating diet.  Pain well controlled.  The patient is currently breastfeeding.   This baby is a female    Objective:      Vital signs:  Vital Signs Range for the last 24 hours  Temperature: Temp:  [98 °F (36.7 °C)-98.6 °F (37 °C)] 98 °F (36.7 °C)   Temp Source: Temp src: Oral   BP: BP: (110-113)/(52-80) 112/80   Pulse: Heart Rate:  [70-88] 73   Respirations: Resp:  [16-18] 16   Weight: 89.8 kg (198 lb)     General: Alert & oriented x4, in no apparent distress  Abdomen: soft, nontender  Uterus: firm, nontender  Extremities: nontender; no edema      Labs:  Lab Results   Component Value Date    WBC 15.76 (H) 2023    HGB 11.4 (L) 2023    HCT 33.7 (L) 2023    .6 (H) 2023     2023     Results from last 7 days   Lab Units 23  1652   ABO TYPING  O   RH TYPING  Positive     External Prenatal Results     Pregnancy Outside Results - Transcribed From Office Records - See Scanned Records For Details     Test Value Date Time    ABO  O  23 1652    Rh  Positive  23 1652    Antibody Screen  Negative  23 1652       Negative  23 1156       Negative  23 1304       Negative  10/12/22 1020    Varicella IgG       Rubella  1.80 index 10/12/22 1020    Hgb  11.4 g/dL 23 0625       13.9 g/dL 23 1652       11.3 g/dL 23 1156       12.5 g/dL 23 1304       14.0 g/dL 10/12/22 1020    Hct  33.7 % 23 0625       39.8 % 23 1652       31.9 % 23 1156       35.6 % 23 1304       41.1 % 10/12/22 1020    Glucose Fasting GTT       Glucose Tolerance Test 1 hour        Glucose Tolerance Test 3 hour       Gonorrhea (discrete)       Chlamydia (discrete)       RPR  Non Reactive  10/12/22 1020    VDRL       Syphilis Antibody       HBsAg  Negative  10/12/22 1020    Herpes Simplex Virus PCR       Herpes Simplex VIrus Culture       HIV  Non Reactive  10/12/22 1020    Hep C RNA Quant PCR       Hep C Antibody  <0.1 s/co ratio 10/12/22 1020    AFP  24.4 ng/mL 12/07/22 1056    Group B Strep  No Group B Streptococcus isolated  04/26/23 1843    GBS Susceptibility to Clindamycin       GBS Susceptibility to Erythromycin       Fetal Fibronectin       Genetic Testing, Maternal Blood             Drug Screening     Test Value Date Time    Urine Drug Screen       Amphetamine Screen  Negative ng/mL 11/09/22 1030       Negative ng/mL 10/12/22 1030    Barbiturate Screen  Negative ng/mL 11/09/22 1030       Negative ng/mL 10/12/22 1030    Benzodiazepine Screen  Negative ng/mL 11/09/22 1030       Negative ng/mL 10/12/22 1030    Methadone Screen  Negative ng/mL 11/09/22 1030       Negative ng/mL 10/12/22 1030    Phencyclidine Screen  Negative ng/mL 11/09/22 1030       Negative ng/mL 10/12/22 1030    Opiates Screen       THC Screen       Cocaine Screen       Propoxyphene Screen  Negative ng/mL 11/09/22 1030       Negative ng/mL 10/12/22 1030    Buprenorphine Screen       Methamphetamine Screen       Oxycodone Screen       Tricyclic Antidepressants Screen             Legend    ^: Historical                        Assessment/Plan:      PPD#1 s/p vaginal delivery.  1. S/p vaginal delivery: Doing well.Continue routine postpartum care.  Hemodynamically stable.   2. Infant feeding: Supportive care.  The patient is currently breastfeeding.

## 2023-05-19 VITALS
TEMPERATURE: 98.3 F | HEART RATE: 70 BPM | HEIGHT: 63 IN | RESPIRATION RATE: 18 BRPM | WEIGHT: 198 LBS | SYSTOLIC BLOOD PRESSURE: 108 MMHG | BODY MASS INDEX: 35.08 KG/M2 | DIASTOLIC BLOOD PRESSURE: 66 MMHG

## 2023-05-19 PROBLEM — Z34.90 CURRENTLY PREGNANT: Status: RESOLVED | Noted: 2023-05-16 | Resolved: 2023-05-19

## 2023-05-19 PROBLEM — Z34.90 PREGNANCY: Status: RESOLVED | Noted: 2022-10-12 | Resolved: 2023-05-19

## 2023-05-19 PROCEDURE — 0503F POSTPARTUM CARE VISIT: CPT | Performed by: NURSE PRACTITIONER

## 2023-05-19 RX ORDER — IBUPROFEN 600 MG/1
600 TABLET ORAL EVERY 6 HOURS PRN
Qty: 30 TABLET | Refills: 0 | Status: SHIPPED | OUTPATIENT
Start: 2023-05-19

## 2023-05-19 RX ADMIN — DOCUSATE SODIUM 100 MG: 100 CAPSULE, LIQUID FILLED ORAL at 08:10

## 2023-05-19 RX ADMIN — IBUPROFEN 600 MG: 600 TABLET, FILM COATED ORAL at 04:57

## 2023-05-19 NOTE — DISCHARGE SUMMARY
Discharge Summary    Date of Admission: 2023  Date of Discharge:  2023      Patient: Nai Ramirez      MR#:[id number]    Delivery Provider: Maggi Collins     Presenting Problem/History of Present Illness  Currently pregnant [Z34.90]       * No active hospital problems. *         Discharge Diagnosis: Vaginal delivery at 39w1d    Procedures:  Vaginal, Spontaneous     2023    6:58 AM        Hospital Course  Patient is a 22 y.o. female  at 39w1d status post vaginal delivery without complication. Postpartum the patient did well. She remained afebrile, with vital signs stable. She was ready for discharge on postpartum day 2.     Infant:   female  fetus 3530 g (7 lb 12.5 oz)  with Apgar scores of 8 , 9  at five minutes.    Condition on Discharge:  Stable    Vital Signs  Temp:  [97.7 °F (36.5 °C)-98.7 °F (37.1 °C)] 98.3 °F (36.8 °C)  Heart Rate:  [61-70] 70  Resp:  [16-18] 18  BP: (108-118)/(66-68) 108/66    Lab Results   Component Value Date    WBC 15.76 (H) 2023    HGB 11.4 (L) 2023    HCT 33.7 (L) 2023    .6 (H) 2023     2023       Discharge Disposition  Home or Self Care    Discharge Medications     Discharge Medications      New Medications      Instructions Start Date   ibuprofen 600 MG tablet  Commonly known as: ADVIL,MOTRIN   600 mg, Oral, Every 6 Hours PRN         Continue These Medications      Instructions Start Date   acetaminophen 500 MG tablet  Commonly known as: TYLENOL   500 mg, Oral, Every 6 Hours PRN      docusate sodium 250 MG capsule  Commonly known as: COLACE   250 mg, Oral, Daily      PRENATAL VITAMIN PO   Oral         Stop These Medications    doxylamine 25 MG tablet  Commonly known as: UNISOM     ferrous sulfate 325 (65 FE) MG tablet     Progesterone 100 MG capsule  Commonly known as: Prometrium              Additional Instructions for the Follow-ups that You Need to Schedule     Discharge Follow-up with Specified Provider:  morteza; 6 Weeks   As directed      To: morteza    Follow Up: 6 Weeks               Andreia Kim, APRN  05/19/23  09:29 EDT  Csd

## 2023-05-19 NOTE — PAYOR COMM NOTE
"Nai Ramirez (22 y.o. Female)     Auth#[id number]    Discharged 5/19/23 with Baby.    From:Rocio Duke LPN, Utilization Review  Phone #[phone]  Fax #[phone]      Date of Birth   2000    Social Security Number       Address   [address] [facility] [address]    Home Phone   [phone]    MRN   [id number]       Latter day   None    Marital Status                               Admission Date   5/16/23    Admission Type   Elective    Admitting Provider   Maggi Collins MD    Attending Provider       Department, Room/Bed   [facility] MOTHER BABY 4B, N432/1       Discharge Date   5/19/2023    Discharge Disposition   Home or Self Care    Discharge Destination                               Attending Provider: (none)   Allergies: Omnicef [Cefdinir]    Isolation: None   Infection: None   Code Status: Prior    Ht: 160 cm (63\")   Wt: 89.8 kg (198 lb)    Admission Cmt: None   Principal Problem: Currently pregnant [Z34.90]                 Active Insurance as of 5/16/2023     Primary Coverage     Payor Plan Insurance Group Employer/Plan Group    [employer] BLUE CROSS [employer] BLUE CROSS BLUE [employer]IELD PPO [id number]     Payor Plan Address Payor Plan Phone Number Payor Plan Fax Number Effective Dates    [address] [phone]  11/1/2016 - None Entered    [facility] [id number]       Subscriber Name Subscriber Birth Date Member ID       LUISITO CANO 12/20/1985 [id number]           Secondary Coverage     Payor Plan Insurance Group Employer/Plan Group    [place]      Payor Plan Address Payor Plan Phone Number Payor Plan Fax Number Effective Dates    [address]   4/1/2022 - None Entered    [facility] [phone]       Subscriber Name Subscriber Birth Date Member ID       NAI RAMIREZ 2000 [id number]                 Emergency Contacts      (Rel.) Home Phone Work Phone Mobile Phone    Bob Murray (Spouse) " [phone] -- --    TATUM CANO (Mother) -- -- [phone]    estelle flores (Relative) -- -- [phone]               Operative/Procedure Notes (last 7 days)      Maggi Collins MD at 23 0716            2023    Patient:Nai Ramirez    MR#:[id number]    Vaginal Delivery Note  22 y.o. yo female  at 39w1d    Patient Active Problem List   Diagnosis   • Pregnancy   • Tobacco use during pregnancy, antepartum   • Cervical funneling   • Cervical shortening affecting pregnancy in second trimester   • Cervical cerclage suture present, antepartum   • Currently pregnant       Delivery     Delivery: Vaginal, Spontaneous     YOB: 2024    Time of Birth: 6:58 AM      Anesthesia: Epidural     Delivering clinician: Maggi Collins    Forceps?   No   Vacuum? No    Shoulder dystocia present: No     Pt progressed to complete, AROM, and then pushed for a short time and delivered without difficulty. Baby vigorous and crying.      Infant    Findings: female  infant     Infant observations: Weight: 3530 g (7 lb 12.5 oz)     Observations/Comments:        Apgars: 8  @ 1 minute /    9  @ 5 minutes         Placenta, Cord, and Fluid    Placenta delivered  Spontaneous  at  2023  7:01 AM     Cord:   present.   Nuchal Cord?  no   Cord blood obtained:     Cord gases obtained:      Cord gas results: Pending         Repair    Episiotomy: No   Lacerations: Yes  Laceration Information  Laceration Repaired?   Perineal: None      Periurethral:       Labial: right  Yes    Sulcus:       Vaginal: Yes  Yes    Cervical:         Suture used for repair: 2-0 Vicryl   Estimated Blood Loss:   200mls.         Complications  none    Disposition  Mother to Mother Baby/Postpartum  in stable condition currently.  Baby to remains with mom  in stable condition currently.                Maggi Collins MD  23  07:16 EDT            Electronically signed by Maggi Collins MD at 23          Discharge Summary       Andreia Kim, LANDON at 23 0928          Discharge Summary    Date of Admission: 2023  Date of Discharge:  2023      Patient: Nai Ramirez      MR#:[id number]    Delivery Provider: Maggi Collins     Presenting Problem/History of Present Illness  Currently pregnant [Z34.90]       * No active hospital problems. *         Discharge Diagnosis: Vaginal delivery at 39w1d    Procedures:  Vaginal, Spontaneous     2023    6:58 AM        Hospital Course  Patient is a 22 y.o. female  at 39w1d status post vaginal delivery without complication. Postpartum the patient did well. She remained afebrile, with vital signs stable. She was ready for discharge on postpartum day 2.     Infant:   female  fetus 3530 g (7 lb 12.5 oz)  with Apgar scores of 8 , 9  at five minutes.    Condition on Discharge:  Stable    Vital Signs  Temp:  [97.7 °F (36.5 °C)-98.7 °F (37.1 °C)] 98.3 °F (36.8 °C)  Heart Rate:  [61-70] 70  Resp:  [16-18] 18  BP: (108-118)/(66-68) 108/66    Lab Results   Component Value Date    WBC 15.76 (H) 2023    HGB 11.4 (L) 2023    HCT 33.7 (L) 2023    .6 (H) 2023     2023       Discharge Disposition  Home or Self Care    Discharge Medications     Discharge Medications      New Medications      Instructions Start Date   ibuprofen 600 MG tablet  Commonly known as: ADVIL,MOTRIN   600 mg, Oral, Every 6 Hours PRN         Continue These Medications      Instructions Start Date   acetaminophen 500 MG tablet  Commonly known as: TYLENOL   500 mg, Oral, Every 6 Hours PRN      docusate sodium 250 MG capsule  Commonly known as: COLACE   250 mg, Oral, Daily      PRENATAL VITAMIN PO   Oral         Stop These Medications    doxylamine 25 MG tablet  Commonly known as: UNISOM     ferrous sulfate 325 (65 FE) MG tablet     Progesterone 100 MG capsule  Commonly known as: Prometrium              Additional Instructions for the Follow-ups that You Need to  Schedule     Discharge Follow-up with Specified Provider: morteza; 6 Weeks   As directed      To: morteza    Follow Up: 6 Weeks               LANDON Beverly  05/19/23  09:29 EDT  Csd          Electronically signed by Andreia Kim APRN at 05/19/23 0917

## 2023-05-23 NOTE — H&P
History and Physical:    Subjective     Chief Complaint   Patient presents with   • Scheduled Induction       Nai Ramirez is a 22 y.o. year old  with an Estimated Date of Delivery: 23 currently at 39w1d presenting with no complaints. For her scheduled induction.     Prenatal care has been with No att. providers found Karina.  It has been significant for shortened cervix and s/p cerclage 22 weeks, removed 36 wks. .        Review of Systems  Pertinent items are noted in HPI.     Past Medical History:   Diagnosis Date   • Anxiety     self diagnosed; never treated   • History of asthma     in childhood   • Urinary tract infection    • Yeast infection      Past Surgical History:   Procedure Laterality Date   • CERVICAL CERCLAGE N/A 2023    Procedure: CERVICAL CERCLAGE;  Surgeon: Maggi Pascal MD;  [facility];  Service: Gynecology;  Laterality: N/A;     Family History   Problem Relation Age of Onset   • Breast cancer Neg Hx    • Ovarian cancer Neg Hx    • Uterine cancer Neg Hx    • Colon cancer Neg Hx      Social History     Tobacco Use   • Smoking status: Former     Types: Cigarettes     Quit date: 2022     Years since quittin.7   • Smokeless tobacco: Current   • Tobacco comments:     Vapes nicotine    Vaping Use   • Vaping Use: Every day   • Substances: Nicotine   • Devices: Pre-filled or refillable cartridge   Substance Use Topics   • Alcohol use: Not Currently     Comment: 0-3/twice monthly when not pregnant   • Drug use: Not Currently     Types: Marijuana     Comment: last use 2022     No medications prior to admission.     Allergies:  Omnicef [cefdinir]  OB History    Para Term  AB Living   1 1 1 0 0 1   SAB IAB Ectopic Molar Multiple Live Births   0 0 0 0 0 1      # Outcome Date GA Lbr Vlad/2nd Weight Sex Delivery Anes PTL Lv   1 Term 22 39w1d / 01:18 3530 g (7 lb 12.5 oz) F Vag-Spont EPI N CORINNA      Obstetric Comments   FOB #1  ": Pregnancy #1 (NIPS = negative, female)          Objective     /66 (BP Location: Right arm)   Pulse 70   Temp 98.3 °F (36.8 °C) (Oral)   Resp 18   Ht 160 cm (63\")   Wt 89.8 kg (198 lb)   LMP 08/16/2022   Breastfeeding Yes   BMI 35.07 kg/m²     Physical Exam    General:  No acute distress           Abdomen: Gravid, nontender       FHT's: reactive    Cervix: 3-4   [place]: Contraction are irreg     Lab Review   External Prenatal Results     Pregnancy Outside Results - Transcribed From Office Records - See Scanned Records For Details     Test Value Date Time    ABO  O  05/16/23 1652    Rh  Positive  05/16/23 1652    Antibody Screen  Negative  05/16/23 1652       Negative  03/01/23 1156       Negative  01/18/23 1304       Negative  10/12/22 1020    Varicella IgG       Rubella  1.80 index 10/12/22 1020    Hgb  11.4 g/dL 05/18/23 0625       13.9 g/dL 05/16/23 1652       11.3 g/dL 03/01/23 1156       12.5 g/dL 01/18/23 1304       14.0 g/dL 10/12/22 1020    Hct  33.7 % 05/18/23 0625       39.8 % 05/16/23 1652       31.9 % 03/01/23 1156       35.6 % 01/18/23 1304       41.1 % 10/12/22 1020    Glucose Fasting GTT       Glucose Tolerance Test 1 hour       Glucose Tolerance Test 3 hour       Gonorrhea (discrete)       Chlamydia (discrete)       RPR  Non Reactive  10/12/22 1020    VDRL       Syphilis Antibody       HBsAg  Negative  10/12/22 1020    Herpes Simplex Virus PCR       Herpes Simplex VIrus Culture       HIV  Non Reactive  10/12/22 1020    Hep C RNA Quant PCR       Hep C Antibody  <0.1 s/co ratio 10/12/22 1020    AFP  24.4 ng/mL 12/07/22 1056    Group B Strep  No Group B Streptococcus isolated  04/26/23 1843    GBS Susceptibility to Clindamycin       GBS Susceptibility to Erythromycin       Fetal Fibronectin       Genetic Testing, Maternal Blood             Drug Screening     Test Value Date Time    Urine Drug Screen       Amphetamine Screen  Negative ng/mL 11/09/22 1030       Negative ng/mL 10/12/22 1030    " Barbiturate Screen  Negative ng/mL 11/09/22 1030       Negative ng/mL 10/12/22 1030    Benzodiazepine Screen  Negative ng/mL 11/09/22 1030       Negative ng/mL 10/12/22 1030    Methadone Screen  Negative ng/mL 11/09/22 1030       Negative ng/mL 10/12/22 1030    Phencyclidine Screen  Negative ng/mL 11/09/22 1030       Negative ng/mL 10/12/22 1030    Opiates Screen       THC Screen       Cocaine Screen       Propoxyphene Screen  Negative ng/mL 11/09/22 1030       Negative ng/mL 10/12/22 1030    Buprenorphine Screen       Methamphetamine Screen       Oxycodone Screen       Tricyclic Antidepressants Screen             Legend    ^: Historical                            Assessment & Plan     ASSESSMENT  1. IUP at 39w1d  2. rupture of membranes   3. GBBSnegative  PLAN  1. Admit to labor and delivery   2.  gene Collins MD  5/23/2023@

## 2023-07-06 ENCOUNTER — TELEPHONE (OUTPATIENT)
Dept: OBSTETRICS AND GYNECOLOGY | Facility: CLINIC | Age: 23
End: 2023-07-06

## 2023-07-06 NOTE — TELEPHONE ENCOUNTER
Hub staff attempted to follow warm transfer process and was unsuccessful     Caller: Nai Ramirez    Relationship to patient: Self    Best call back number: [phone]    Patient is needing: PT DELIVERED 5/17/23 AND STATES SHE IS NEEDING TO SCHEDULE HER 6 WEEK PP, PLEASE ADVISE PT.

## 2023-08-08 DIAGNOSIS — F41.9 ANXIETY: ICD-10-CM
